# Patient Record
Sex: FEMALE | Race: WHITE | Employment: FULL TIME | URBAN - METROPOLITAN AREA
[De-identification: names, ages, dates, MRNs, and addresses within clinical notes are randomized per-mention and may not be internally consistent; named-entity substitution may affect disease eponyms.]

---

## 2023-10-02 PROBLEM — Z36.89 NST (NON-STRESS TEST) REACTIVE (HHS-HCC): Status: ACTIVE | Noted: 2023-10-02

## 2023-10-02 PROBLEM — N83.299 OTHER OVARIAN CYST, UNSPECIFIED SIDE: Status: ACTIVE | Noted: 2023-10-02

## 2023-10-02 PROBLEM — E66.01 OBESITY, CLASS III, BMI 40-49.9 (MORBID OBESITY) (MULTI): Status: ACTIVE | Noted: 2023-10-02

## 2023-10-02 PROBLEM — R73.09 ABNORMAL GLUCOSE TOLERANCE TEST (GTT): Status: ACTIVE | Noted: 2023-10-02

## 2023-10-02 PROBLEM — O09.90 HIGH RISK PREGNANCY, ANTEPARTUM (HHS-HCC): Status: ACTIVE | Noted: 2023-10-02

## 2023-10-02 PROBLEM — I10 CHRONIC HYPERTENSION: Status: ACTIVE | Noted: 2023-10-02

## 2023-10-02 PROBLEM — O10.919 CHRONIC HYPERTENSION IN PREGNANCY (HHS-HCC): Status: ACTIVE | Noted: 2023-10-02

## 2023-10-02 PROBLEM — O14.90 PRE-ECLAMPSIA AFFECTING PREGNANCY, ANTEPARTUM (HHS-HCC): Status: ACTIVE | Noted: 2023-10-02

## 2023-10-02 PROBLEM — Z22.330 GBS CARRIER: Status: ACTIVE | Noted: 2023-10-02

## 2023-10-02 RX ORDER — NIFEDIPINE 60 MG/1
1 TABLET, FILM COATED, EXTENDED RELEASE ORAL DAILY
COMMUNITY
Start: 2022-01-22 | End: 2023-10-04 | Stop reason: ALTCHOICE

## 2023-10-02 RX ORDER — NAPROXEN SODIUM 220 MG/1
81 TABLET, FILM COATED ORAL DAILY
COMMUNITY
Start: 2021-07-26 | End: 2024-04-27 | Stop reason: HOSPADM

## 2023-10-02 RX ORDER — IBUPROFEN 600 MG/1
600 TABLET ORAL EVERY 6 HOURS
COMMUNITY
Start: 2022-02-14 | End: 2023-10-04 | Stop reason: ALTCHOICE

## 2023-10-02 RX ORDER — FLUOXETINE HYDROCHLORIDE 40 MG/1
1.5 CAPSULE ORAL DAILY
COMMUNITY
End: 2023-10-04 | Stop reason: ALTCHOICE

## 2023-10-02 RX ORDER — DOCUSATE SODIUM 100 MG/1
100 CAPSULE, LIQUID FILLED ORAL 2 TIMES DAILY PRN
COMMUNITY
Start: 2022-02-14 | End: 2023-10-04 | Stop reason: ALTCHOICE

## 2023-10-02 RX ORDER — ACETAMINOPHEN 500 MG
1000 TABLET ORAL EVERY 6 HOURS PRN
COMMUNITY
Start: 2022-02-14

## 2023-10-02 RX ORDER — BREAST PUMP
EACH MISCELLANEOUS
COMMUNITY
Start: 2022-01-27 | End: 2023-10-04 | Stop reason: ALTCHOICE

## 2023-10-02 RX ORDER — CITALOPRAM 40 MG/1
1 TABLET, FILM COATED ORAL DAILY
COMMUNITY
End: 2023-10-04 | Stop reason: ALTCHOICE

## 2023-10-04 ENCOUNTER — INITIAL PRENATAL (OUTPATIENT)
Dept: OBSTETRICS AND GYNECOLOGY | Facility: CLINIC | Age: 33
End: 2023-10-04
Payer: COMMERCIAL

## 2023-10-04 VITALS — DIASTOLIC BLOOD PRESSURE: 80 MMHG | BODY MASS INDEX: 45.6 KG/M2 | WEIGHT: 274 LBS | SYSTOLIC BLOOD PRESSURE: 162 MMHG

## 2023-10-04 DIAGNOSIS — Z34.91 PRENATAL CARE IN FIRST TRIMESTER (HHS-HCC): ICD-10-CM

## 2023-10-04 DIAGNOSIS — I10 CHRONIC HYPERTENSION: ICD-10-CM

## 2023-10-04 DIAGNOSIS — Z3A.08 8 WEEKS GESTATION OF PREGNANCY (HHS-HCC): Primary | ICD-10-CM

## 2023-10-04 DIAGNOSIS — O09.91 HIGH-RISK PREGNANCY IN FIRST TRIMESTER (HHS-HCC): ICD-10-CM

## 2023-10-04 DIAGNOSIS — O10.919 CHRONIC HYPERTENSION AFFECTING PREGNANCY (HHS-HCC): ICD-10-CM

## 2023-10-04 PROBLEM — R73.09 ABNORMAL GLUCOSE TOLERANCE TEST (GTT): Status: RESOLVED | Noted: 2023-10-02 | Resolved: 2023-10-04

## 2023-10-04 PROBLEM — Z22.330 GBS CARRIER: Status: RESOLVED | Noted: 2023-10-02 | Resolved: 2023-10-04

## 2023-10-04 PROBLEM — O14.90 PRE-ECLAMPSIA AFFECTING PREGNANCY, ANTEPARTUM (HHS-HCC): Status: RESOLVED | Noted: 2023-10-02 | Resolved: 2023-10-04

## 2023-10-04 PROCEDURE — 84156 ASSAY OF PROTEIN URINE: CPT

## 2023-10-04 PROCEDURE — 87086 URINE CULTURE/COLONY COUNT: CPT

## 2023-10-04 PROCEDURE — 82570 ASSAY OF URINE CREATININE: CPT

## 2023-10-04 PROCEDURE — 87800 DETECT AGNT MULT DNA DIREC: CPT

## 2023-10-04 PROCEDURE — 0500F INITIAL PRENATAL CARE VISIT: CPT | Performed by: ADVANCED PRACTICE MIDWIFE

## 2023-10-04 RX ORDER — NIFEDIPINE 30 MG/1
30 TABLET, FILM COATED, EXTENDED RELEASE ORAL
Qty: 30 TABLET | Refills: 3 | Status: SHIPPED | OUTPATIENT
Start: 2023-10-04 | End: 2023-10-27

## 2023-10-04 RX ORDER — SERTRALINE HYDROCHLORIDE 50 MG/1
50 TABLET, FILM COATED ORAL DAILY
COMMUNITY
Start: 2023-09-08 | End: 2023-12-12 | Stop reason: ALTCHOICE

## 2023-10-04 ASSESSMENT — EDINBURGH POSTNATAL DEPRESSION SCALE (EPDS)
I HAVE LOOKED FORWARD WITH ENJOYMENT TO THINGS: AS MUCH AS I EVER DID
I HAVE BLAMED MYSELF UNNECESSARILY WHEN THINGS WENT WRONG: NOT VERY OFTEN
THE THOUGHT OF HARMING MYSELF HAS OCCURRED TO ME: NEVER
I HAVE BEEN ABLE TO LAUGH AND SEE THE FUNNY SIDE OF THINGS: AS MUCH AS I ALWAYS COULD
I HAVE FELT SCARED OR PANICKY FOR NO GOOD REASON: NO, NOT MUCH
THINGS HAVE BEEN GETTING ON TOP OF ME: YES, SOMETIMES I HAVEN'T BEEN COPING AS WELL AS USUAL
I HAVE BEEN SO UNHAPPY THAT I HAVE BEEN CRYING: NO, NEVER
I HAVE BEEN SO UNHAPPY THAT I HAVE HAD DIFFICULTY SLEEPING: NOT AT ALL
TOTAL SCORE: 6
I HAVE FELT SAD OR MISERABLE: NOT VERY OFTEN
I HAVE BEEN ANXIOUS OR WORRIED FOR NO GOOD REASON: HARDLY EVER

## 2023-10-04 NOTE — PROGRESS NOTES
Assessment   Problem List Items Addressed This Visit             ICD-10-CM    Chronic hypertension I10    Relevant Medications    NIFEdipine ER (NIFEdipine XL) 30 mg 24 hr tablet    Chronic hypertension affecting pregnancy O10.919    Relevant Medications    NIFEdipine ER (NIFEdipine XL) 30 mg 24 hr tablet     Other Visit Diagnoses         Codes    8 weeks gestation of pregnancy    -  Primary Z3A.08    Relevant Orders    US MAC OB imaging order    Lactate Dehydrogenase    Protein, Urine Random (Completed)    Uric Acid    Comprehensive Metabolic Panel    CBC    Referral to Maternal Fetal Medicine    Hemoglobin A1c    BMI 45.0-49.9, adult (CMS/[facility])     Z68.42    Relevant Orders    Hemoglobin A1c    Prenatal care in first trimester     Z34.91    Relevant Orders    ABO/Rh    CBC Anemia Panel With Reflex,Pregnancy    HEMOGLOBIN IDENTIFICATION WITH PATH REVIEW    Hepatitis B surface antigen    Hepatitis C antibody    Rubella Antibody, IgG    Syphilis Screen with Reflex    HIV 1/2 Antigen/Antibody Screen with Reflex to Confirmation    C. trachomatis + N. gonorrhoeae, Amplified    Urine culture    High-risk pregnancy in first trimester     O09.91            Plan   NOB plan: New OB resources provided and reviewed with particular attention to dietary, travel, and medication restrictions  Oriented to practice, CNM vs. MD care  Reviewed bleeding precautions, warning signs, when to call provider; phone number provided  Discussed bASA for PEC prophylaxis  Routine NOB labs ordered  MFM consult placed for CHTN, started on meds   Return in 4 weeks for routine prenatal care w/ MD only   Req for ultrasound given   Consult w/ DR. Garcia today re: pt. BP. Plan to start Nifedipine 30mg daily. Discussed importance of home BP monitoring by patient and to call if not in goal range.     Saige Crawford, CA-TREYM    planned  Subjective   Cheyenne Wolf is a 33 y.o.  at 8w3d with a working estimated date of delivery of  2024, by Last Menstrual Period who presents for an initial prenatal visit. This pregnancy is planned.    Patient currently experiencing:  none, anxiety re: increased BP     Bleeding or cramping since LMP: no  Taking prenatal vitamin: Yes  Ultrasound completed this pregnancy: No    OB History    Para Term  AB Living   2 1 0 1 0 1   SAB IAB Ectopic Multiple Live Births   0 0 0 0 1      # Outcome Date GA Lbr Kyree/2nd Weight Sex Delivery Anes PTL Lv   2 Current            1  22 36w5d  2580 g M Vag-Spont  Y MORENA      Complications: Preeclampsia        Prior pregnancy complications: pre-eclampsia    History of hypertension:  Yes, chronic hypertension    Past Medical History:   Diagnosis Date    Contusion of right lesser toe(s) without damage to nail, initial encounter 2019    Contusion of toe, right    Depression     H/O pre-eclampsia in prior pregnancy, currently pregnant, third trimester     Pain in unspecified toe(s) 2019    Toe pain      History reviewed. No pertinent surgical history.     Social History     Tobacco Use    Smoking status: Never    Smokeless tobacco: Never   Vaping Use    Vaping Use: Never used   Substance Use Topics    Alcohol use: Not Currently    Drug use: Never        Objective   Physical Exam  Weight: 124 kg (274 lb)  Expected Total Weight Gain: Could not be calculated   Pregravid BMI: Could not be calculated  BP: 162/80    OBGyn Exam    Postpartum Depression: Not on file        Pregnancy Problems (from 10/04/23 to present)       No problems associated with this episode.

## 2023-10-05 LAB
BACTERIA UR CULT: NORMAL
C TRACH RRNA SPEC QL NAA+PROBE: NEGATIVE
CREAT UR-MCNC: 52.6 MG/DL (ref 20–320)
N GONORRHOEA DNA SPEC QL PROBE+SIG AMP: NEGATIVE
PROT UR-ACNC: 5 MG/DL (ref 5–24)
PROT/CREAT UR: 0.1 MG/MG CREAT (ref 0–0.17)

## 2023-10-12 ENCOUNTER — TELEPHONE (OUTPATIENT)
Dept: OBSTETRICS AND GYNECOLOGY | Facility: CLINIC | Age: 33
End: 2023-10-12
Payer: COMMERCIAL

## 2023-10-12 DIAGNOSIS — F32.A DEPRESSION AFFECTING PREGNANCY (HHS-HCC): Primary | ICD-10-CM

## 2023-10-12 DIAGNOSIS — O99.340 DEPRESSION AFFECTING PREGNANCY (HHS-HCC): Primary | ICD-10-CM

## 2023-10-12 NOTE — TELEPHONE ENCOUNTER
Pt is 9.4 wks and saw Saige for a NOB appt 10/4/23. She has an upcoming appt with Dr. Garcia. Pt takes 50mg zoloft daily and is requesting a higher dose due to increasing anxiety. Pt has been referred to the  o/p team but has been playing phone tag for an appt. Pt is asking if Dr. Garcia or Saige can increase her dose until she can be seen. I have sent an email to the perinatalOP team to call pt for an appt. She is aware office will call her back once a provider advises on Rx.

## 2023-10-13 NOTE — TELEPHONE ENCOUNTER
Component      Latest Ref Rng & Units 7/5/2021 9/9/2022   Fasting Status      Hours 12    CHOLESTEROL      <=199 mg/dL 182 143   TRIGLYCERIDE      <=149 mg/dL 112 108   HDL      >=40 mg/dL 51 49   CALCULATED LDL      <=129 mg/dL 109 72   CALCULATED NON HDL      mg/dL 131 94   CHOL/HDL      <=4.4 3.6 2.9      Pt is aware that zoloft will be increased to 75mg daily and that Saige will send Rx to her pharm. Pt also states that she was able to get an appt with Dr. Lopez 10/27/23.

## 2023-10-15 RX ORDER — SERTRALINE HYDROCHLORIDE 25 MG/1
25 TABLET, FILM COATED ORAL DAILY
Qty: 30 TABLET | Refills: 0 | Status: SHIPPED | OUTPATIENT
Start: 2023-10-15 | End: 2023-11-10

## 2023-10-18 ENCOUNTER — APPOINTMENT (OUTPATIENT)
Dept: BEHAVIORAL HEALTH | Facility: CLINIC | Age: 33
End: 2023-10-18
Payer: COMMERCIAL

## 2023-10-23 ENCOUNTER — LAB (OUTPATIENT)
Dept: LAB | Facility: LAB | Age: 33
End: 2023-10-23
Payer: COMMERCIAL

## 2023-10-23 DIAGNOSIS — Z3A.08 8 WEEKS GESTATION OF PREGNANCY (HHS-HCC): ICD-10-CM

## 2023-10-23 DIAGNOSIS — Z34.91 PRENATAL CARE IN FIRST TRIMESTER (HHS-HCC): ICD-10-CM

## 2023-10-23 LAB
ABO GROUP (TYPE) IN BLOOD: NORMAL
ALBUMIN SERPL BCP-MCNC: 4.1 G/DL (ref 3.4–5)
ALP SERPL-CCNC: 53 U/L (ref 33–110)
ALT SERPL W P-5'-P-CCNC: 15 U/L (ref 7–45)
ANION GAP SERPL CALC-SCNC: 12 MMOL/L (ref 10–20)
AST SERPL W P-5'-P-CCNC: 13 U/L (ref 9–39)
BILIRUB SERPL-MCNC: 0.3 MG/DL (ref 0–1.2)
BUN SERPL-MCNC: 7 MG/DL (ref 6–23)
CALCIUM SERPL-MCNC: 9.2 MG/DL (ref 8.6–10.3)
CHLORIDE SERPL-SCNC: 102 MMOL/L (ref 98–107)
CO2 SERPL-SCNC: 25 MMOL/L (ref 21–32)
CREAT SERPL-MCNC: 0.61 MG/DL (ref 0.5–1.05)
ERYTHROCYTE [DISTWIDTH] IN BLOOD BY AUTOMATED COUNT: 12.6 % (ref 11.5–14.5)
GFR SERPL CREATININE-BSD FRML MDRD: >90 ML/MIN/1.73M*2
GLUCOSE SERPL-MCNC: 105 MG/DL (ref 74–99)
HCT VFR BLD AUTO: 40.1 % (ref 36–46)
HGB BLD-MCNC: 13.4 G/DL (ref 12–16)
LDH SERPL L TO P-CCNC: 151 U/L (ref 84–246)
MCH RBC QN AUTO: 30.9 PG (ref 26–34)
MCHC RBC AUTO-ENTMCNC: 33.4 G/DL (ref 32–36)
MCV RBC AUTO: 92 FL (ref 80–100)
NRBC BLD-RTO: 0 /100 WBCS (ref 0–0)
PLATELET # BLD AUTO: 318 X10*3/UL (ref 150–450)
PMV BLD AUTO: 9.1 FL (ref 7.5–11.5)
POTASSIUM SERPL-SCNC: 3.6 MMOL/L (ref 3.5–5.3)
PROT SERPL-MCNC: 7.3 G/DL (ref 6.4–8.2)
RBC # BLD AUTO: 4.34 X10*6/UL (ref 4–5.2)
RH FACTOR (ANTIGEN D): NORMAL
SODIUM SERPL-SCNC: 135 MMOL/L (ref 136–145)
URATE SERPL-MCNC: 4.4 MG/DL (ref 2.3–6.7)
WBC # BLD AUTO: 9.2 X10*3/UL (ref 4.4–11.3)

## 2023-10-23 PROCEDURE — 83615 LACTATE (LD) (LDH) ENZYME: CPT

## 2023-10-23 PROCEDURE — 84550 ASSAY OF BLOOD/URIC ACID: CPT

## 2023-10-23 PROCEDURE — 86900 BLOOD TYPING SEROLOGIC ABO: CPT

## 2023-10-23 PROCEDURE — 86317 IMMUNOASSAY INFECTIOUS AGENT: CPT

## 2023-10-23 PROCEDURE — 85027 COMPLETE CBC AUTOMATED: CPT

## 2023-10-23 PROCEDURE — 87389 HIV-1 AG W/HIV-1&-2 AB AG IA: CPT

## 2023-10-23 PROCEDURE — 83036 HEMOGLOBIN GLYCOSYLATED A1C: CPT

## 2023-10-23 PROCEDURE — 80053 COMPREHEN METABOLIC PANEL: CPT

## 2023-10-23 PROCEDURE — 87340 HEPATITIS B SURFACE AG IA: CPT

## 2023-10-23 PROCEDURE — 36415 COLL VENOUS BLD VENIPUNCTURE: CPT

## 2023-10-23 PROCEDURE — 83021 HEMOGLOBIN CHROMOTOGRAPHY: CPT

## 2023-10-23 PROCEDURE — 86901 BLOOD TYPING SEROLOGIC RH(D): CPT

## 2023-10-23 PROCEDURE — 86780 TREPONEMA PALLIDUM: CPT

## 2023-10-23 PROCEDURE — 83020 HEMOGLOBIN ELECTROPHORESIS: CPT

## 2023-10-23 PROCEDURE — 86803 HEPATITIS C AB TEST: CPT

## 2023-10-23 ASSESSMENT — ENCOUNTER SYMPTOMS
NECK PAIN: 0
PALPITATIONS: 0
SWEATS: 0
BLURRED VISION: 0
SHORTNESS OF BREATH: 0
HYPERTENSION: 1
PND: 0
ORTHOPNEA: 0
HEADACHES: 0

## 2023-10-24 LAB
EST. AVERAGE GLUCOSE BLD GHB EST-MCNC: 105 MG/DL
HBA1C MFR BLD: 5.3 %
HBV SURFACE AG SERPL QL IA: NONREACTIVE
HCV AB SER QL: NONREACTIVE
HIV 1+2 AB+HIV1 P24 AG SERPL QL IA: NONREACTIVE
REFLEX ADDED, ANEMIA PANEL: NORMAL
RUBV IGG SERPL IA-ACNC: 1.9 IA
RUBV IGG SERPL QL IA: POSITIVE
T PALLIDUM AB SER QL: NONREACTIVE

## 2023-10-25 ENCOUNTER — INITIAL PRENATAL (OUTPATIENT)
Dept: MATERNAL FETAL MEDICINE | Facility: CLINIC | Age: 33
End: 2023-10-25
Payer: COMMERCIAL

## 2023-10-25 ENCOUNTER — TELEPHONE (OUTPATIENT)
Dept: OBSTETRICS AND GYNECOLOGY | Facility: CLINIC | Age: 33
End: 2023-10-25

## 2023-10-25 ENCOUNTER — ANCILLARY PROCEDURE (OUTPATIENT)
Dept: RADIOLOGY | Facility: CLINIC | Age: 33
End: 2023-10-25
Payer: COMMERCIAL

## 2023-10-25 ENCOUNTER — SOCIAL WORK (OUTPATIENT)
Dept: BEHAVIORAL HEALTH | Facility: CLINIC | Age: 33
End: 2023-10-25
Payer: COMMERCIAL

## 2023-10-25 VITALS — WEIGHT: 274 LBS | BODY MASS INDEX: 45.6 KG/M2 | SYSTOLIC BLOOD PRESSURE: 158 MMHG | DIASTOLIC BLOOD PRESSURE: 104 MMHG

## 2023-10-25 DIAGNOSIS — Z3A.08 8 WEEKS GESTATION OF PREGNANCY (HHS-HCC): ICD-10-CM

## 2023-10-25 DIAGNOSIS — F43.10 PTSD (POST-TRAUMATIC STRESS DISORDER): ICD-10-CM

## 2023-10-25 DIAGNOSIS — O10.919 CHRONIC HYPERTENSION AFFECTING PREGNANCY (HHS-HCC): Primary | ICD-10-CM

## 2023-10-25 DIAGNOSIS — F41.1 GAD (GENERALIZED ANXIETY DISORDER): ICD-10-CM

## 2023-10-25 DIAGNOSIS — F41.9 ANXIETY: ICD-10-CM

## 2023-10-25 PROCEDURE — 76801 OB US < 14 WKS SINGLE FETUS: CPT | Performed by: OBSTETRICS & GYNECOLOGY

## 2023-10-25 PROCEDURE — 99213 OFFICE O/P EST LOW 20 MIN: CPT | Performed by: OBSTETRICS & GYNECOLOGY

## 2023-10-25 PROCEDURE — 76801 OB US < 14 WKS SINGLE FETUS: CPT

## 2023-10-25 PROCEDURE — 90686 IIV4 VACC NO PRSV 0.5 ML IM: CPT | Performed by: OBSTETRICS & GYNECOLOGY

## 2023-10-25 PROCEDURE — 90791 PSYCH DIAGNOSTIC EVALUATION: CPT | Mod: AJ,95 | Performed by: SOCIAL WORKER

## 2023-10-25 PROCEDURE — 99213 OFFICE O/P EST LOW 20 MIN: CPT | Mod: 25 | Performed by: OBSTETRICS & GYNECOLOGY

## 2023-10-25 RX ORDER — ASPIRIN 81 MG/1
162 TABLET ORAL ONCE
Status: DISCONTINUED | OUTPATIENT
Start: 2023-10-25 | End: 2024-04-27 | Stop reason: HOSPADM

## 2023-10-25 NOTE — TELEPHONE ENCOUNTER
Attempted to call pt back to further discuss her message.   Got her voice mail.     Message left to call office.

## 2023-10-25 NOTE — PROGRESS NOTES
[facility]  Department of Obstetrics & Gynecology  Division of Maternal Fetal Medicine    Patient Name: Cheyenne Wolf  Patient YOB: 1990  Patient MRN: [id number]  Estimated Date of Delivery: 24    Cheyenne Wolf is a  at 11w3d who presents for [facility] consultation for chronic hypertension. Pregnancy is also complicated by anxiety/depression, history of superimposed preeclampsia with severe features, class III obesity.    Chronic Hypertension, with history of superimposed preeclampsia with severe features  After discussion with the patient and review of home BP (mostly 125/80s, max one time was 140/90), it is evident the patient has concurrent significant white coat hypertension. We discussed that she should take her BP at home prior to appointments to help confirm this.  Currently stable on nifedipine ER 30mg daily  Pre-pregnancy meds: none  Start aspirin 162mg daily  Baseline PreE labs WNL, uPCR 0.1  Blood pressure goals in pregnancy are 130-140s/80-90s. Encouraged home monitoring of BP. BP log given. If >50% of home BP are >140/90s, I recommend laboratory and symptom assessment of preeclampsia, and if that diagnosis has been excluded, increasing the dose of her antihypertensive medication.  Hypertension, with or without antihypertensive drug treatment is associated with adverse pregnancy outcome including fetal growth restriction, stillbirth,  delivery, and placental abruption.  Maternal risks are also increased including PE, stroke, SiPreE (especially given her prior history of superimposed preeclampsia), compared with normotensive pregnancies.    With TN, the patient will be monitored more closely with growth ultrasounds (every 4 weeks beginning at 24w) and BPPs (every week beginning at 32w) if on medications.    In the absence of other maternal or fetal indications for delivery, plan for delivery around 37-38w (pending BP control)    Anxiety and depression  Current  medications: zoloft 75mcg. Was previously on prozac but changed to zoloft in August for pregnancy.  She can go back on this if she needs to, prozac is safe in pregnancy.  SSRI/SNRIs increase risk of  Poor Adjustment Syndrome: Irritability, feeding difficulties, increased respiratory rate. Severe symptoms are rare and occur in 3%. Symptoms peak in 24-48h and resolve in a week. There is no evidence that discontinuing or tapering these medications in the third trimester reduces this risk, but it does increase the risk of maternal relapse.   When weighing the risks and benefits, in regards to continued treatment, it is important to note that having untreated psychiatric disorders is related to pregnancy complications (LBW, PTD).  Each patient has to make an individualized decision based upon their level of comfort with either continuation or discontinuation of medication.    She has follow-up scheduled with Dr. Lopez's group on 10/27    Class III obesity  With increasing BMI there is known to be associated with several increased risks including increased risk ongenital malformations, pre-term delivery, gestational diabetes, pre-eclampsia,  delivery and VTE.   Once pregnant, there is a recommended weight gain that is lower than if the patient has a normal BMI prior to pregnancy.  These goals should be discussed at the patient's first prenatal visit.  We recommend Ms. Wolf gain no more than 20lb this pregnancy.   surveillance as above  Healthy diet and exercise encouraged    Routine prenatal care  Prenatal labs WNL, Rh positive  Desires cfDNA with primary OB-GYN  NT US in 2 weeks  Flu vaccine administered today  COVID-19 - booster encouraged, especially with two upcoming trips ([place],  and [facility]) in the next month    Thank you for this consult. Please reach out for any questions or concerns.    Sandra Orozco MD  Maternal Fetal Medicine    HPI: Overall doing ok. Very anxious about  "being in a physicians office. She reports significant birth trauma regarding last pregnancy (superimposed preeclampsia with severe features,  delivery) and is tearful today. [place] management last pregnancy was very \"reactive\" and would like to change that somehow. No nausea or vomiting, but reports significant fatigue. Denies vaginal bleeding or cramping    Obstetrical History  :  at 36w, IOL superimposed preeclampsia with severe features (BP); VMI [id number]  : current    Gynecological History  Last pap smear 2023: cytology negative, HPV negative  Remote history of abnormal pap smear  Denies history of sexually transmitted infections    Medical History  CHTN  Class III obesity    Surgical History  Denies    Medications  Nifedipine XL 30mg daily  Prenatal vitamin  Zoloft 75mg  *To start aspirin 162mg today    Allergies  No Known Allergies    Family History  Denies family history of birth defects  Maternal grand mother with Non-Hodgkins lymphoma    Social History  Nonsmoker  Denies illicit drug use    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

## 2023-10-26 LAB
HEMOGLOBIN A2: 2.7 % (ref 2–3.5)
HEMOGLOBIN A: 97 % (ref 95.8–98)
HEMOGLOBIN F: 0.3 % (ref 0–2)
HEMOGLOBIN IDENTIFICATION INTERPRETATION: NORMAL
PATH REVIEW-HGB IDENTIFICATION: NORMAL

## 2023-10-26 NOTE — TELEPHONE ENCOUNTER
Pt returned call to office.       Pt desires to have cfDNA test.    Pt will plan to pickvup box kit tomorrow.

## 2023-10-27 ENCOUNTER — TELEMEDICINE (OUTPATIENT)
Dept: BEHAVIORAL HEALTH | Facility: CLINIC | Age: 33
End: 2023-10-27
Payer: COMMERCIAL

## 2023-10-27 ENCOUNTER — LAB (OUTPATIENT)
Dept: LAB | Facility: LAB | Age: 33
End: 2023-10-27
Payer: COMMERCIAL

## 2023-10-27 ENCOUNTER — APPOINTMENT (OUTPATIENT)
Dept: BEHAVIORAL HEALTH | Facility: CLINIC | Age: 33
End: 2023-10-27
Payer: COMMERCIAL

## 2023-10-27 DIAGNOSIS — O09.90 HIGH RISK PREGNANCY, ANTEPARTUM (HHS-HCC): ICD-10-CM

## 2023-10-27 DIAGNOSIS — F43.10 PTSD (POST-TRAUMATIC STRESS DISORDER): ICD-10-CM

## 2023-10-27 DIAGNOSIS — F41.1 GAD (GENERALIZED ANXIETY DISORDER): ICD-10-CM

## 2023-10-27 DIAGNOSIS — I10 CHRONIC HYPERTENSION: ICD-10-CM

## 2023-10-27 DIAGNOSIS — O10.919 CHRONIC HYPERTENSION AFFECTING PREGNANCY (HHS-HCC): ICD-10-CM

## 2023-10-27 PROCEDURE — 36415 COLL VENOUS BLD VENIPUNCTURE: CPT

## 2023-10-27 PROCEDURE — 99214 OFFICE O/P EST MOD 30 MIN: CPT | Performed by: STUDENT IN AN ORGANIZED HEALTH CARE EDUCATION/TRAINING PROGRAM

## 2023-10-27 RX ORDER — FLUOXETINE HYDROCHLORIDE 40 MG/1
40 CAPSULE ORAL DAILY
Qty: 30 CAPSULE | Refills: 11 | Status: SHIPPED | OUTPATIENT
Start: 2023-10-27 | End: 2023-11-27 | Stop reason: SDUPTHER

## 2023-10-27 RX ORDER — NIFEDIPINE 30 MG/1
30 TABLET, FILM COATED, EXTENDED RELEASE ORAL
Qty: 90 TABLET | Refills: 2 | Status: SHIPPED | OUTPATIENT
Start: 2023-10-27 | End: 2024-04-08 | Stop reason: WASHOUT

## 2023-10-27 RX ORDER — LORAZEPAM 0.5 MG/1
0.5 TABLET ORAL DAILY PRN
Qty: 14 TABLET | Refills: 1 | Status: SHIPPED | OUTPATIENT
Start: 2023-10-27 | End: 2023-12-11 | Stop reason: SDUPTHER

## 2023-10-31 ENCOUNTER — APPOINTMENT (OUTPATIENT)
Dept: OBSTETRICS AND GYNECOLOGY | Facility: CLINIC | Age: 33
End: 2023-10-31
Payer: COMMERCIAL
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
## 2023-11-07 ENCOUNTER — TELEPHONE (OUTPATIENT)
Dept: OBSTETRICS AND GYNECOLOGY | Facility: CLINIC | Age: 33
End: 2023-11-07
Payer: COMMERCIAL

## 2023-11-07 NOTE — TELEPHONE ENCOUNTER
----- Message from CA Enriquez-TREYM sent at 11/6/2023  7:35 PM EST -----  Risk reducing cfDNA  Male

## 2023-11-07 NOTE — TELEPHONE ENCOUNTER
Pt is aware of negative Myriad results and that it states Male. She states that she saw results on line yesterday!

## 2023-11-08 ENCOUNTER — APPOINTMENT (OUTPATIENT)
Dept: RADIOLOGY | Facility: CLINIC | Age: 33
End: 2023-11-08
Payer: COMMERCIAL

## 2023-11-09 ENCOUNTER — ANCILLARY PROCEDURE (OUTPATIENT)
Dept: RADIOLOGY | Facility: CLINIC | Age: 33
End: 2023-11-09
Payer: COMMERCIAL

## 2023-11-09 DIAGNOSIS — Z34.90 ENCOUNTER FOR SUPERVISION OF NORMAL PREGNANCY, UNSPECIFIED, UNSPECIFIED TRIMESTER (HHS-HCC): ICD-10-CM

## 2023-11-09 PROCEDURE — 76815 OB US LIMITED FETUS(S): CPT | Performed by: STUDENT IN AN ORGANIZED HEALTH CARE EDUCATION/TRAINING PROGRAM

## 2023-11-09 PROCEDURE — 76801 OB US < 14 WKS SINGLE FETUS: CPT

## 2023-11-10 DIAGNOSIS — F32.A DEPRESSION AFFECTING PREGNANCY (HHS-HCC): ICD-10-CM

## 2023-11-10 DIAGNOSIS — O99.340 DEPRESSION AFFECTING PREGNANCY (HHS-HCC): ICD-10-CM

## 2023-11-10 RX ORDER — SERTRALINE HYDROCHLORIDE 25 MG/1
TABLET, FILM COATED ORAL
Qty: 90 TABLET | Refills: 1 | Status: SHIPPED | OUTPATIENT
Start: 2023-11-10 | End: 2023-12-12 | Stop reason: ALTCHOICE

## 2023-11-20 ENCOUNTER — TELEMEDICINE (OUTPATIENT)
Dept: BEHAVIORAL HEALTH | Facility: CLINIC | Age: 33
End: 2023-11-20
Payer: COMMERCIAL

## 2023-11-20 ENCOUNTER — CLINICAL SUPPORT (OUTPATIENT)
Dept: BEHAVIORAL HEALTH | Facility: CLINIC | Age: 33
End: 2023-11-20
Payer: COMMERCIAL

## 2023-11-20 DIAGNOSIS — F41.1 GENERALIZED ANXIETY DISORDER: ICD-10-CM

## 2023-11-20 DIAGNOSIS — F43.10 POSTTRAUMATIC STRESS DISORDER: ICD-10-CM

## 2023-11-20 DIAGNOSIS — F41.1 GAD (GENERALIZED ANXIETY DISORDER): ICD-10-CM

## 2023-11-20 DIAGNOSIS — F43.10 PTSD (POST-TRAUMATIC STRESS DISORDER): ICD-10-CM

## 2023-11-20 PROCEDURE — 99214 OFFICE O/P EST MOD 30 MIN: CPT | Performed by: STUDENT IN AN ORGANIZED HEALTH CARE EDUCATION/TRAINING PROGRAM

## 2023-11-20 PROCEDURE — 90853 GROUP PSYCHOTHERAPY: CPT | Mod: AJ,95 | Performed by: SOCIAL WORKER

## 2023-11-20 NOTE — GROUP NOTE
Group Topic: Coping Skills   Group Date: 11/20/2023  Start Time: 10:00 AM  End Time: 11:00 AM  Facilitators: MANAN Shah   Department: [facility]        Name: Cheyenne Wolf YOB: 1990   MR: [id number]      This was a video IOP group on a HIPAA compliant platform. All patients were provided with informed consent.  Date: 11/20/23, Time: 10am-11pm group, Number of Patients:3, User Name: MANAN jamison  Number of Staff: 1    Group topic(s): Codependent relationships   Provide psychoeducation on codependent relationships. Discussed signs of codependency and ways to prevent/stop behaviors. Played video for group on codependent relationships. Gave group members the opportunity to share what's important to them in relationships. Reviewed healthy vs unhealthy characteristics in relationships. Cheyenne was present, attentive.   Facilitator: MANAN Babb

## 2023-11-20 NOTE — PROGRESS NOTES
Subjective    All Individuals Present: Patient and Provider (Encounter Provider)     ID: Cheyenne Wolf is a 33 y.o. female with history of KENJI and PTSD presenting to  IOP.     Interval History/HPI/PFSH:  @15wga  Was anxious about starting group today but feeling more reassured.    Was able to take older son to [place] and felt better, did not expect to be less anxious taking son on her own. Has only taken lorazepam 3 times in past month.    Feeling more confident and assured about communicating with providers.    Bps have been stable, normotensive.    Will be hosting Thanksgiving this week.    Denies SI, HI, AVH.     Medication side effects: None Reported     Review of Systems  Constitutional: Negative  Psychiatric: Positive for anxiety, fatigue, and phobia , Negative for depression, irritability, mood swings, and sleep disturbance  Neurological: Negative   Other: pregnant @15wga    Objective   LMP 2023 (Exact Date)   Wt Readings from Last 4 Encounters:   24 123 kg (271 lb)   23 122 kg (270 lb)   10/25/23 124 kg (274 lb)   10/04/23 124 kg (274 lb)       Mental Status Exam  General Appearance: Well groomed, appropriate eye contact.  Attitude/Behavior: Cooperative, conversant, engaged, and with good eye contact.  Motor: No psychomotor agitation or retardation, no tremor or other abnormal movements.  Speech: Normal rate, volume, prosody  Gait/Station: Within normal limits  Mood: anxious.  Affect: Constricted, Anxious, and Congruent with mood and topic of conversation  Thought Process: Linear, goal directed  Thought Associations: No loosening of associations  Thought Content: normal  Sensorium: Alert and oriented to person, place, time and situation  Insight: Intact, as evidenced by awareness of symptom patterns, introspection  Judgment: Intact  Cognition: Cognitively intact to conversational testing with respect to attention, orientation, fund of knowledge, recent and remote memory, and  language.  Testing: N/A.    Laboratory/Imaging/Diagnostic Tests       Assessment/Plan   Overall Formulation and Differential Diagnosis:  Cheyenne Wolf is a 33 y.o. female who meets criteria for KENJI and PTSD.  Interval Assessment:   @12wga presenting to [facility] d/t worsening anxiety and hypervigilance in pregnancy. Had previously been on fluoxetine to fair effect after traumatic birth experience with first child. Was switched to sertraline with current pregnancy by outside provider, has noted worsening anxiety and no purported benefit from such. Will try transitioning back to fluoxetine given prior efficacy. Discussed limited data regarding fluoxetine, and discussed risks, benefits, and alternatives in context of pregnancy and postpartum, and she was agreeable.    Plan:  -discontinue sertraline  -increase fluoxetine to 40 mg PO daily, plan to taper to effect  -Reviewed treatment goals listed in treatment plan and pt progressing towards these goals as seen by engagement in groups.  -Continue Intensive Outpatient Program.     Risk Assessment:  Imminent Risk of Suicide or Serious Self-Injury: Low Risk -- Risk factors include: History of trauma or abuse  Protective factors include:Denies current suicidal ideation, Denies history of suicide attempts , Future-oriented talk , Willingness to seek help and support , Skills in problem solving, conflict resolution, and nonviolent handling of disputes, Cultural and Pentecostal beliefs that discourage suicide and support self-preservation , Access to a variety of clinical interventions , Receiving and engaged in care for mental, physical, and substance use disorders , History of adhering to treatment recommendations and/or prescribed medication regimen , Support through ongoing medical and mental healthcare relationships , Current/history of good response to treatment/meds , Interpersonal relationships and supports, e.g., family, friends, peers, community , and Restricted access  to firearms or other lethal means of suicide   Imminent Risk of Violence or Homicide: Low Risk - Risk factors include: No significant risk factors identified on screening. Protective factors include: Lack of known history of harm to others , Lack of known history of violent ideation , Lack of known access to firearms , Sense of community, availability/access to resources and support , Sense of optimism, hope , Interpersonal competence , Affect regulation , Sense of self-efficacy, internal locus of control , and Positive, pro-social family/peer network   Treatment Plan:  There are no recently modified care plans to display for this patient.      Attestation Statements   Portion Spent on Counseling & Coordination of Care: N/A - E&M coding by complexity of care.

## 2023-11-20 NOTE — GROUP NOTE
Group Topic: Coping Skills   Group Date: 11/20/2023  Start Time: 11:00 AM  End Time: 12:00 PM  Facilitators: MANAN Shah   Department: [facility]        Name: Cheyenne Wolf YOB: 1990   MR: [id number]      This was a video IOP group on a HIPAA compliant platform. All patients were provided with informed consent.    Date: 11//23, Time: 11AM-12PM group, Number of Patients: 3, User Name: [email or web address]  Number of Staff: 1    Group topic(s): Codependency continued & qualities of a healthy relationships  Shared “green lights' in relations and forgiveness worksheets. Reviewed assertive communication with group and provided examples.  Patient given the opportunity to ask questions and share responses.   Cheyenne was present, attentive and participated in group discussions.      Facilitator: MANAN Babb

## 2023-11-20 NOTE — GROUP NOTE
"Group Topic: Coping Skills   Group Date: 11/20/2023  Start Time:  9:00 AM  End Time: 10:00 AM  Facilitators: JACKY Shaw   Department: [facility]        Name: Cheyenne Wolf YOB: 1990   MR: [id number]      This was a video IOP group on a HIPAA compliant platform. All patients were provided with informed consent.     Date: 11/20/2023, Time: 9-10a, Number of Patients: 3, User Name: [email or web address],  Number of Staff: 1  Group topic(s): Check in with feelings identification. Discussion about unproductive and productive worries and coping strategies. Group began with patients checking in noticing their current emotions and sharing about related behaviors, body sensations associated with the emotion. Group then discussed differences between productive and unproductive worries, ways to cope with worry thoughts (Ie. socratic questioning, DBT TIPP skills.) Cheyenne introduced herself to the group- shared feeling anxious about starting IOP. She felt using the socratic question \"what would I tell a friend?\" Could help her cope with worry about her pregnancy.    MANAN Bragg  "

## 2023-11-21 ENCOUNTER — CLINICAL SUPPORT (OUTPATIENT)
Dept: BEHAVIORAL HEALTH | Facility: CLINIC | Age: 33
End: 2023-11-21
Payer: COMMERCIAL

## 2023-11-21 DIAGNOSIS — F41.1 GAD (GENERALIZED ANXIETY DISORDER): ICD-10-CM

## 2023-11-21 DIAGNOSIS — F43.10 PTSD (POST-TRAUMATIC STRESS DISORDER): ICD-10-CM

## 2023-11-21 PROCEDURE — 90837 PSYTX W PT 60 MINUTES: CPT | Mod: AJ,95 | Performed by: SOCIAL WORKER

## 2023-11-22 ENCOUNTER — APPOINTMENT (OUTPATIENT)
Dept: OBSTETRICS AND GYNECOLOGY | Facility: CLINIC | Age: 33
End: 2023-11-22
Payer: COMMERCIAL

## 2023-11-24 ENCOUNTER — DOCUMENTATION (OUTPATIENT)
Dept: BEHAVIORAL HEALTH | Facility: CLINIC | Age: 33
End: 2023-11-24
Payer: COMMERCIAL

## 2023-11-27 ENCOUNTER — TELEMEDICINE (OUTPATIENT)
Dept: BEHAVIORAL HEALTH | Facility: CLINIC | Age: 33
End: 2023-11-27
Payer: COMMERCIAL

## 2023-11-27 ENCOUNTER — CLINICAL SUPPORT (OUTPATIENT)
Dept: BEHAVIORAL HEALTH | Facility: CLINIC | Age: 33
End: 2023-11-27
Payer: COMMERCIAL

## 2023-11-27 DIAGNOSIS — F43.10 POSTTRAUMATIC STRESS DISORDER: ICD-10-CM

## 2023-11-27 DIAGNOSIS — F41.1 GENERALIZED ANXIETY DISORDER: ICD-10-CM

## 2023-11-27 DIAGNOSIS — F41.1 GAD (GENERALIZED ANXIETY DISORDER): ICD-10-CM

## 2023-11-27 DIAGNOSIS — F43.10 PTSD (POST-TRAUMATIC STRESS DISORDER): ICD-10-CM

## 2023-11-27 PROCEDURE — 90853 GROUP PSYCHOTHERAPY: CPT | Mod: AJ,95 | Performed by: SOCIAL WORKER

## 2023-11-27 PROCEDURE — 3008F BODY MASS INDEX DOCD: CPT | Performed by: STUDENT IN AN ORGANIZED HEALTH CARE EDUCATION/TRAINING PROGRAM

## 2023-11-27 PROCEDURE — 99214 OFFICE O/P EST MOD 30 MIN: CPT | Performed by: STUDENT IN AN ORGANIZED HEALTH CARE EDUCATION/TRAINING PROGRAM

## 2023-11-27 RX ORDER — FLUOXETINE HYDROCHLORIDE 20 MG/1
20 CAPSULE ORAL DAILY
Qty: 30 CAPSULE | Refills: 1 | Status: SHIPPED | OUTPATIENT
Start: 2023-11-27 | End: 2024-01-29

## 2023-11-27 RX ORDER — FLUOXETINE HYDROCHLORIDE 40 MG/1
40 CAPSULE ORAL DAILY
Qty: 30 CAPSULE | Refills: 1 | Status: SHIPPED | OUTPATIENT
Start: 2023-11-27 | End: 2024-02-26 | Stop reason: SDUPTHER

## 2023-11-27 NOTE — PROGRESS NOTES
Subjective    All Individuals Present: Patient and Provider (Encounter Provider)     ID: Cheyenne Wolf is a 34 y.o. female with history of KENJI and PTSD presenting to  IOP.     Interval History/HPI/PFSH:  @16wga    Got through holiday okay. Looking forward to appointment with Dr. Helms. Will be in [id number] and relieved that she will be near hospital if something goes wrong.    Staying more active with Pilates.    Denies SI, HI, AVH.     Medication side effects: None Reported     Review of Systems  Constitutional: Negative  Psychiatric: Positive for anxiety, fatigue, and phobia , Negative for depression, irritability, mood swings, and sleep disturbance  Neurological: Negative   Other: pregnant @16wga    Objective   LMP 2023 (Exact Date)   Wt Readings from Last 4 Encounters:   24 124 kg (272 lb 9.6 oz)   24 121 kg (267 lb 11.2 oz)   24 123 kg (271 lb)   23 122 kg (270 lb)       Mental Status Exam  General Appearance: Well groomed, appropriate eye contact.  Attitude/Behavior: Cooperative, conversant, engaged, and with good eye contact.  Motor: No psychomotor agitation or retardation, no tremor or other abnormal movements.  Speech: Normal rate, volume, prosody  Gait/Station: Within normal limits  Mood: anxious.  Affect: Constricted, Anxious, and Congruent with mood and topic of conversation  Thought Process: Linear, goal directed  Thought Associations: No loosening of associations  Thought Content: normal  Sensorium: Alert and oriented to person, place, time and situation  Insight: Intact, as evidenced by awareness of symptom patterns, introspection  Judgment: Intact  Cognition: Cognitively intact to conversational testing with respect to attention, orientation, fund of knowledge, recent and remote memory, and language.  Testing: N/A.    Laboratory/Imaging/Diagnostic Tests       Assessment/Plan   Overall Formulation and Differential Diagnosis:  Cheyenne Wolf is a 34 y.o. female  who meets criteria for KENJI and PTSD.  Interval Assessment:   @16wga presenting to IOP d/t worsening anxiety and hypervigilance in pregnancy. Had previously been on fluoxetine to fair effect after traumatic birth experience with first child. Was switched to sertraline with current pregnancy by outside provider, has noted worsening anxiety and no purported benefit from such. Will try transitioning back to fluoxetine given prior efficacy. Discussed limited data regarding fluoxetine, and discussed risks, benefits, and alternatives in context of pregnancy and postpartum, and she was agreeable.    Plan:  -discontinue sertraline  -continue fluoxetine 40 mg PO daily, plan increase to 60 mg next week  -Reviewed treatment goals listed in treatment plan and pt progressing towards these goals as seen by engagement in groups.  -Continue Intensive Outpatient Program.     Risk Assessment:  Imminent Risk of Suicide or Serious Self-Injury: Low Risk -- Risk factors include: History of trauma or abuse  Protective factors include:Denies current suicidal ideation, Denies history of suicide attempts , Future-oriented talk , Willingness to seek help and support , Skills in problem solving, conflict resolution, and nonviolent handling of disputes, Cultural and Yazidi beliefs that discourage suicide and support self-preservation , Access to a variety of clinical interventions , Receiving and engaged in care for mental, physical, and substance use disorders , History of adhering to treatment recommendations and/or prescribed medication regimen , Support through ongoing medical and mental healthcare relationships , Current/history of good response to treatment/meds , Interpersonal relationships and supports, e.g., family, friends, peers, community , and Restricted access to firearms or other lethal means of suicide   Imminent Risk of Violence or Homicide: Low Risk - Risk factors include: No significant risk factors identified on  screening. Protective factors include: Lack of known history of harm to others , Lack of known history of violent ideation , Lack of known access to firearms , Sense of community, availability/access to resources and support , Sense of optimism, hope , Interpersonal competence , Affect regulation , Sense of self-efficacy, internal locus of control , and Positive, pro-social family/peer network   Treatment Plan:  There are no recently modified care plans to display for this patient.      Attestation Statements   Portion Spent on Counseling & Coordination of Care: N/A - E&M coding by complexity of care.

## 2023-11-27 NOTE — GROUP NOTE
Group Topic: Coping Skills   Group Date: 11/27/2023  Start Time: 10:00 AM  End Time: 11:00 AM  Facilitators: MANAN Shah   Department: [facility]        Name: Cheyenne Wolf YOB: 1990   MR: [id number]    This was a video IOP group on a HIPAA compliant platform. All patients were provided with informed consent.  Date: 11/27/23, Time: 10am-11pm group, Number of Patients:3, User Name: MANAN jamison  Number of Staff: 1    Group topic(s): Self-care and stress management   Spoke with group about their weekend and recent holiday. Provide psychoeducation on stress and its physical/mental impact. Reviewed self-care tips and sleep hygiene. Patient discussing managing care giver stress and their traumatic experiences.  Cheyenne was present, attentive.   Facilitator: MANAN Babb

## 2023-11-27 NOTE — GROUP NOTE
Group Topic: Coping Skills   Group Date: 11/27/2023  Start Time: 11:00 AM  End Time: 12:00 PM  Facilitators: MANAN Shah   Department: [facility]        Name: Cheyenne Wolf YOB: 1990   MR: [id number]      Date: 11/27, Time: 11AM-12PM group, Number of Patients: 3, User Name: [email or web address]  Number of Staff: 1    Group topic(s): Stress management and self-care continued   Group completing stress exploration worksheet . Reviewed behavioral scheduling and healthy vs unhealthy coping skills. Guided mediation done with the group. Patient given the opportunity to ask questions and share responses. Cheyenne was present, attentive and participated in group discussions.      Facilitator: MANAN Babb

## 2023-11-28 ENCOUNTER — CLINICAL SUPPORT (OUTPATIENT)
Dept: BEHAVIORAL HEALTH | Facility: CLINIC | Age: 33
End: 2023-11-28
Payer: COMMERCIAL

## 2023-11-28 DIAGNOSIS — F43.10 PTSD (POST-TRAUMATIC STRESS DISORDER): ICD-10-CM

## 2023-11-28 DIAGNOSIS — F41.1 GAD (GENERALIZED ANXIETY DISORDER): ICD-10-CM

## 2023-11-28 PROCEDURE — 90853 GROUP PSYCHOTHERAPY: CPT | Mod: AJ,95 | Performed by: SOCIAL WORKER

## 2023-11-28 NOTE — GROUP NOTE
"Group Topic: Feeling Awareness/Expression   Group Date: 11/28/2023  Start Time:  9:00 AM  End Time: 10:00 AM  Facilitators: JACKY Shaw   Department: [facility]        Name: Cheyenne Wolf YOB: 1990   MR: [id number]      This was a video IOP group on a HIPAA compliant platform. All patients were provided with informed consent.     Date: 11/28/2023, Time: 9-10a, Number of Patients: 3, User Name: [email or web address],  Number of Staff: 1  Group topic(s): Check in, introduction to emotion regulation. Patients checked in about their emotions, stressors. Reflected on remaining thoughts about groups from the day prior. Patients were then introduced to DBT emotion regulation module- what emotions are and why we have them. Cheyenne shared feeling somewhat flustered. She reflected on the \"myths about emotions\" worksheet, identifying with the myth \"being emotional means being out of control.\"    MANAN Bragg  "

## 2023-11-30 ENCOUNTER — APPOINTMENT (OUTPATIENT)
Dept: BEHAVIORAL HEALTH | Facility: CLINIC | Age: 33
End: 2023-11-30
Payer: COMMERCIAL

## 2023-11-30 NOTE — GROUP NOTE
Group Topic: Coping Skills   Group Date: 11/28/2023  Start Time: 11:00 AM  End Time: 12:00 PM  Facilitators: JACKY Shaw; MANAN Shah   Department: [facility]        Name: Cheyenne Wolf YOB: 1990   MR: [id number]      This was a video IOP group on a HIPAA compliant platform. All patients were provided with informed consent.     Date: 11/28/2023, Time: 11a-12p, Number of Patients: 3, User Name: [email or web address],  Number of Staff: 2  Group topic(s): Emotion regulation (DBT), continued. Group continued to learn and discuss application of various DBT emotion regulation skills, including checking the facts/challenging cognitive distortions. Patients also discussed ways to accumulate positive emotions through pleasurable activities. Cheyenne shared wanting to increase time reading for pleasure; also shared about self-soothing activities that evoke positive emotions.  MANAN Bragg

## 2023-11-30 NOTE — GROUP NOTE
Group Topic: Coping Skills   Group Date: 11/28/2023  Start Time: 10:00 AM  End Time: 11:00 AM  Facilitators: JACKY Shaw; MANAN Shah   Department: [facility]        Name: Cheyenne Wolf YOB: 1990   MR: [id number]      This was a video IOP group on a HIPAA compliant platform. All patients were provided with informed consent.     Date: 11/28/2023, Time: 10-11a, Number of Patients: 3, User Name: [email or web address],  Number of Staff: 2  Group topic(s): Emotion regulation (DBT), continued. Group continued with psychoeducation about the model of emotions from DBT- components of emotions and common associated action urges. Also learned about the skill of opposite action. Discussed various examples of opposite action. Cheyenne shared about using opposite action for social anxiety.  MANAN Bragg     No

## 2023-12-01 ENCOUNTER — DOCUMENTATION (OUTPATIENT)
Dept: BEHAVIORAL HEALTH | Facility: CLINIC | Age: 33
End: 2023-12-01
Payer: COMMERCIAL

## 2023-12-01 NOTE — PROGRESS NOTES
INTENSIVE OUTPATIENT PROGRAM MULTIDISCIPLINARY  TREATMENT PLAN & PROGRESS NOTE     Patient: Cheyenne Wolf   : 1990  Age:   33   Student: No  Treatment Team Date: 2023       MRN# [id number]    Psychiatrist: Dr. Christian Lopez  Date of IOP Admission: 2023  Ref by: Saige Tejada              Week  1                        Admission Scores:   BLANCA 21:  10 ANDRY: 29  BDI: 26  PHQ-9: 13 MAST: 0 DAST: 0  MDQ:  Negative [place]: 19     Current Risk Assessment:  Suicidal Risk:      None: X    Ideation:       Plan:      Intent:      SI Plan with plan contracts for safety:     Hx of harming self:        Homicidal Risk:  None:  X Ideation:       Plan:      Intent:      HI Plan with means:                                     Hx of harming others:          Global Improvement    Clinical Global Impressions Rating Scale Of Illness:  5  1-No Illness Present   2-Minimal   3-Mild   4-Moderate   5-Marked  X  6-Severe   7-Very Severe     Diagnoses & ICD-10 Codes  Primary Diagnosis & Code: F41.1    Secondary Diagnosis & Code: F43.10     Psychosocial & Environmental Stressors:   Financial  insecurity :   Medication X  Family/Home life: X  Work/School:    Inadequacy of social support   Medical/Physical health: X     Patient's Treatment Goals:            Date Initiated:            Progress Update:  2023  1.  Attend and actively participate in IOP _3_ days/week for 3 hours per day   2023 Good      Fair X   Poor     Unclear   2.  Attend weekly medication management sessions and maintain compliance of medications  2023   Good  X  Fair     Poor     Unclear                                                        2.  Identify symptoms of diagnoses and develop coping plans    2023 Good  X  Fair     Poor     Unclear                                                 3.  Increase illness education and symptom insight                  2023 Good  X  Fair     Poor     Unclear        Current  medications:  See EMR chart        Progress note:  _X_New to the IOP program, attending as planned  __Compliant, Progressing & Improving - Needs more time in IOP therapy program   __Compliant, Progressing & Improving Planning Discharge   __Compliant, Not Progressing or Improving: Reason  __Non-Compliant, not progressing or improving: Plan  __Other:     Insurance & Benefits: Name of Insurance: Medical [employer] [employer], [place], BENEFITS ARE BASED ON MEDICAL NECESSITY ONLY: Unlimited visits, covers 80 % of the contracted rate after deductible has been met. Plan limit of 1:1.     Co-Pay per day: $0    DEDUCTIBLE        Single:  5,000 Family: 10,000 Accumulation:  Single: 882.55 Family: 903.16  CO- INSURANCE  Single:  / Family:  / Accumulation:  Single:   Family:   OUT OF POCKET  Single:  7,200 Family: 14,400 Accumulation:  Single: 1,012.17Family: 1,292.29     Total IOP Sessions completed & authorized to date:  1    Discharge plans have been discussed with patient & Dr. PAMELA Lopez on:   Reason for discharge:    ___Successfully completed IOP treatment:   ___Pt. decided to seek treatment elsewhere:   ___Dropped out or no show more than three times:  ___Benefits exhausted:   ___Other:    Discharge date:                   Discharge Prognosis: Excellent      Good     Fair     Poor    Follow up appointment with: Physician:   Follow up appointment with: Therapist:    Follow up Aftercare group?  Yes __   No__     Patient provided with Crisis Hotline phone number for suicide prevention? Yes __ No __    Discharge Scores: Not Completed     PHQ-9:    Anxiety:    Work/School:    Social Life:    Family Life/Home Resp:     Treatment plan electronically signed by Treatment Team:   LOLI Lopez MD, MANAN Hines J. Wyse, LISW

## 2023-12-01 NOTE — PROGRESS NOTES
INTENSIVE OUTPATIENT PROGRAM MULTIDISCIPLINARY  TREATMENT PLAN & PROGRESS NOTE     Patient: Cheyenne Wolf   : 1990  Age:   33   Student: No  Treatment Team Date: 2023       MRN# [id number]    Psychiatrist: Dr. Christian Lopez  Date of IOP Admission: 2023  Ref by: Saige Tejada              Week  2                        Admission Scores:   BLANCA 21:  10 ANDRY: 29  BDI: 26  PHQ-9: 13 MAST: 0 DAST: 0  MDQ:  Negative [place]: 19     Current Risk Assessment:  Suicidal Risk:      None: X    Ideation:       Plan:      Intent:      SI Plan with plan contracts for safety:     Hx of harming self:        Homicidal Risk:  None:  X Ideation:       Plan:      Intent:      HI Plan with means:                                     Hx of harming others:          Global Improvement    Clinical Global Impressions Rating Scale Of Illness:  5  1-No Illness Present   2-Minimal   3-Mild   4-Moderate   5-Marked  X  6-Severe   7-Very Severe     Diagnoses & ICD-10 Codes  Primary Diagnosis & Code: F41.1    Secondary Diagnosis & Code: F43.10     Psychosocial & Environmental Stressors:   Financial  insecurity :   Medication X  Family/Home life: X  Work/School:    Inadequacy of social support   Medical/Physical health: X     Patient's Treatment Goals:            Date Initiated:            Progress Update:  2023  1.  Attend and actively participate in IOP _3_ days/week for 3 hours per day   2023 Good   X   Fair    Poor     Unclear   2.  Attend weekly medication management sessions and maintain compliance of medications  2023   Good  X  Fair     Poor     Unclear                                                        2.  Identify symptoms of diagnoses and develop coping plans    2023 Good  X  Fair     Poor     Unclear                                                 3.  Increase illness education and symptom insight                  2023 Good  X  Fair     Poor     Unclear        Current  medications:  See EMR chart        Progress note:  __New to the IOP program, attending as planned  _X_Compliant, Progressing & Improving - Needs more time in IOP therapy program   __Compliant, Progressing & Improving Planning Discharge   __Compliant, Not Progressing or Improving: Reason  __Non-Compliant, not progressing or improving: Plan  __Other:     Insurance & Benefits: Name of Insurance: Medical [place] [employer], [place], BENEFITS ARE BASED ON MEDICAL NECESSITY ONLY: Unlimited visits, covers 80 % of the contracted rate after deductible has been met. Plan limit of 1:1.     Co-Pay per day: $0    DEDUCTIBLE        Single:  5,000 Family: 10,000 Accumulation:  Single: 882.55 Family: 903.16  CO- INSURANCE  Single:  / Family:  / Accumulation:  Single:   Family:   OUT OF POCKET  Single:  7,200 Family: 14,400 Accumulation:  Single: 1,012.17Family: 1,292.29     Total IOP Sessions completed & authorized to date:  3    Discharge plans have been discussed with patient & Dr. PAMELA Lopez on:   Reason for discharge:    ___Successfully completed IOP treatment:   ___Pt. decided to seek treatment elsewhere:   ___Dropped out or no show more than three times:  ___Benefits exhausted:   ___Other:    Discharge date:                   Discharge Prognosis: Excellent      Good     Fair     Poor    Follow up appointment with: Physician:   Follow up appointment with: Therapist:    Follow up Aftercare group?  Yes __   No__     Patient provided with Crisis Hotline phone number for suicide prevention? Yes __ No __    Discharge Scores: Not Completed     PHQ-9:    Anxiety:    Work/School:    Social Life:    Family Life/Home Resp:     Treatment plan electronically signed by Treatment Team:   LOLI Lopez MD, ALEXEY HinesS, JACKY Maher-S

## 2023-12-04 ENCOUNTER — CLINICAL SUPPORT (OUTPATIENT)
Dept: BEHAVIORAL HEALTH | Facility: CLINIC | Age: 33
End: 2023-12-04
Payer: COMMERCIAL

## 2023-12-04 ENCOUNTER — TELEMEDICINE (OUTPATIENT)
Dept: BEHAVIORAL HEALTH | Facility: CLINIC | Age: 33
End: 2023-12-04
Payer: COMMERCIAL

## 2023-12-04 DIAGNOSIS — F43.10 PTSD (POST-TRAUMATIC STRESS DISORDER): ICD-10-CM

## 2023-12-04 DIAGNOSIS — F41.1 GAD (GENERALIZED ANXIETY DISORDER): ICD-10-CM

## 2023-12-04 PROCEDURE — 3008F BODY MASS INDEX DOCD: CPT | Performed by: STUDENT IN AN ORGANIZED HEALTH CARE EDUCATION/TRAINING PROGRAM

## 2023-12-04 PROCEDURE — 99214 OFFICE O/P EST MOD 30 MIN: CPT | Performed by: STUDENT IN AN ORGANIZED HEALTH CARE EDUCATION/TRAINING PROGRAM

## 2023-12-04 PROCEDURE — 90853 GROUP PSYCHOTHERAPY: CPT | Mod: AJ,95 | Performed by: SOCIAL WORKER

## 2023-12-04 NOTE — GROUP NOTE
Group Topic: Feeling Awareness/Expression   Group Date: 12/4/2023  Start Time: 10:00 AM  End Time: 11:00 AM  Facilitators: JACKY Shaw   Department: [facility]        Name: Cheyenne Wolf YOB: 1990   MR: [id number]      This was a video IOP group on a HIPAA compliant platform. All patients were provided with informed consent.     Date: 12/4/2023, Time: 10-11a, Number of Patients: 4, User Name: [email or web address],  Number of Staff: 1    Group Topic(s): Mom Power (week 6) continued. Patients received psychoeducation about co-regulation and how the parent's role in this changes across the child's development. Patients then discussed the role of past traumas in shaping their perceptions and their parenting. Patients identified negative behaviors and beliefs related to childhood trauma they did not want to carry into their parenting, and positive ones they did want to carry into their parenting. Facilitator led guided imagery (container) exercise to conclude the session. Cheyenne shared fears she will replicate some negative/unhealthy patterns in her own parenting due to past traumas. She was able to identify at the same time several positive messages and experiences she wants to provide her children.   JACKY Bragg-S

## 2023-12-04 NOTE — GROUP NOTE
Group Topic: Coping Skills   Group Date: 12/4/2023  Start Time: 11:00 AM  End Time: 12:00 PM  Facilitators: MANAN Shah   Department: [facility]        Name: Cheyenne Wolf YOB: 1990   MR: [id number]      This was a video IOP group on a HIPAA compliant platform. All patients were provided with informed consent.    Date: 12/4/23, Time: 11AM-12PM group, Number of Patients: 4, User Name: [email or web address]  Number of Staff: 1    Group topic(s): Stages of change  Provided psychoeducation on stages of change. Shared educational video on the stages of change with the group. Discussed with group what changes they would like to make and the benefits. Reviewed motivational interview change template. Cheyenne was present, attentive and participated in group discussions.      Facilitator: MANAN Babb
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
## 2023-12-04 NOTE — GROUP NOTE
Group Topic: Feeling Awareness/Expression   Group Date: 12/4/2023  Start Time:  9:00 AM  End Time: 10:00 AM  Facilitators: JACKY Shaw   Department: [facility]        Name: Cheyenne Wolf YOB: 1990   MR: [id number]      This was a video IOP group on a HIPAA compliant platform. All patients were provided with informed consent.     Date: 12/4/2023, Time: 9-10a, Number of Patients: 4, User Name: [email or web address],  Number of Staff: 1  Group topic(s): Check in and Mom Power Week 6. Group began with patients checking in and common ground icebreaker exercise. Patients began Mom Power week 6 - reflected on recent times they worked to restore their own and their child's emotional balance. Cheyenne shared about attempting to set limits with her child and also working to restore her own emotional balance.    JACKY Bragg-S

## 2023-12-05 ENCOUNTER — CLINICAL SUPPORT (OUTPATIENT)
Dept: BEHAVIORAL HEALTH | Facility: CLINIC | Age: 33
End: 2023-12-05
Payer: COMMERCIAL

## 2023-12-05 DIAGNOSIS — F41.1 GAD (GENERALIZED ANXIETY DISORDER): ICD-10-CM

## 2023-12-05 DIAGNOSIS — F43.10 PTSD (POST-TRAUMATIC STRESS DISORDER): ICD-10-CM

## 2023-12-05 PROCEDURE — 90853 GROUP PSYCHOTHERAPY: CPT | Mod: AJ,95 | Performed by: SOCIAL WORKER

## 2023-12-05 NOTE — GROUP NOTE
Group Topic: Feeling Awareness/Expression   Group Date: 12/5/2023  Start Time:  9:00 AM  End Time: 10:00 AM  Facilitators: JACKY Shaw   Department: [facility]        Name: Cheyenne Wolf YOB: 1990   MR: [id number]        This was a video IOP group on a HIPAA compliant platform. All patients were provided with informed consent.     Date: 12/5/2023, Time: 9-10a, Number of Patients: 4, User Name: [email or web address],  Number of Staff: 1  Group topic(s): Check in, review of group norms, Mom Power early foundations.  Group began with introductions and patients checking in about their mood and remaining feelings or reactions to the prior day's groups. Members reviewed group guidelines/rules for IOP participation. Members then learned about Mom Power early foundations and discussed qualities of secure attachment. Watched a video about the mother-child bond and discussion followed. Cheyenne shared about moments of connection and warmth with her son. She shared that speaking with the group about childhood trauma the day prior left her somewhat fatigued and drained but she practiced many coping skills.    ALEXEY BraggS

## 2023-12-05 NOTE — GROUP NOTE
Group Topic: Coping Skills   Group Date: 12/5/2023  Start Time: 11:00 AM  End Time: 12:00 PM  Facilitators: MANAN Shah   Department: [facility]        Name: Cheyenne Wolf YOB: 1990   MR: [id number]        This was a video IOP group on a HIPAA compliant platform. All patients were provided with informed consent.    Date: 12/5/23, Time: 11AM-12PM group, Number of Patients: 2, User Name: [email or web address]  Number of Staff: 1    Group topic(s): Values & Aspirations continued   Provided psychoeducation on aspirations. Group completing identifying aspirations worksheet. Group members given the opportunity to share their responses. Group discussing how gender roles have hindered them in achieving there goals/aspirations. Cheyenne was present, attentive and participated in group discussions.      Facilitator: MANAN Babb

## 2023-12-05 NOTE — GROUP NOTE
Group Topic: Coping Skills   Group Date: 12/5/2023  Start Time: 10:00 AM  End Time: 11:00 AM  Facilitators: MANAN Shah   Department: [facility]        Name: Cheyenne Wolf YOB: 1990   MR: [id number]      This was a video IOP group on a HIPAA compliant platform. All patients were provided with informed consent.  Date: 12/5/23, Time: 10am-11pm group, Number of Patients:3, User Name: [email or web address]  Number of Staff: 1    Group topic(s): Values & Aspirations   Provided psychoeducation on Values. Shared Values and meaning presentation with group. Included psychoeducation on values and their importance. Cheyenne was present, attentive.   Facilitator: MANAN Babb

## 2023-12-06 NOTE — PROGRESS NOTES
This session was conducted via HIPAA compliant video. Pt was provided with informed consent.    INDIVIDUAL PSYCHOTHERAPY    Time: 9:21-10:20am    Mental Status: Alert & Oriented x3    Hx of present illness: Pt endorses panic, difficulty controlling worry in relation to reminders of past trauma.    Diagnosis/problems: KENJI, PTSD    Method of therapy:  Supportive, goal setting/treatment planning, psychoeducation    Provider impressions/therapy summary: Met with  IOP pt individually in lieu of group due to low census. Discussed presenting symptoms and pt goals for treatment. Pt shared about her trauma reactions resulting from past birth trauma- impact on thoughts and perceptions, behavior, relationships. Pt described thinking of birth experiences in the forefront of her mind continuously with trouble controlling thoughts. Described self-blaming cognitions regarding the birth. Future choices are impacted- pt always wanted 3 children but fears having additional traumatic birth experiences in the future. Connected pt present evaluations of the experience, particularly regarding self-blame and shame, to past traumas from childhood and related negative core beliefs.     Identified goals/objectives: Goals for IOP- increase coping strategies for anxiety and panic, increase pt sense of support and connection with others experiencing  anxiety and depression, increase pt insight into illness and symptoms.    Response to therapy: Engaged    Treatment plan and process: Continue with above stated goals; follow up in IOP group and individual check ins while pt is engaged in IOP.    MANAN Bragg

## 2023-12-07 ENCOUNTER — CLINICAL SUPPORT (OUTPATIENT)
Dept: BEHAVIORAL HEALTH | Facility: CLINIC | Age: 33
End: 2023-12-07
Payer: COMMERCIAL

## 2023-12-07 DIAGNOSIS — F41.1 GAD (GENERALIZED ANXIETY DISORDER): ICD-10-CM

## 2023-12-07 DIAGNOSIS — F43.10 PTSD (POST-TRAUMATIC STRESS DISORDER): ICD-10-CM

## 2023-12-07 PROCEDURE — 90853 GROUP PSYCHOTHERAPY: CPT | Mod: AJ,95 | Performed by: SOCIAL WORKER

## 2023-12-07 NOTE — GROUP NOTE
Group Topic: Coping Skills   Group Date: 2023  Start Time: 10:00 AM  End Time: 11:00 AM  Facilitators: MANAN Shah   Department: [facility]        Name: Cheyenne Schwabols YOB: 1990   MR: [id number]      This was a video IOP group on a HIPAA compliant platform. All patients were provided with informed consent.  Date: 23, Time: 10am-11pm group, Number of Patients:4, User Name: [email or web address]  Number of Staff: 1    Group topic(s): Kathie Section E- Grief issues   Provided psychoeducation on grief following miscarriage and  loss. Discussed stages of grief and how to move forward in the process of grief. Shared expectations in grief with group. Cheyenne was present, attentive.   Facilitator: MANAN Babb

## 2023-12-07 NOTE — GROUP NOTE
Group Topic: Feeling Awareness/Expression   Group Date: 12/7/2023  Start Time:  9:00 AM  End Time: 10:00 AM  Facilitators: JACKY Shaw   Department: [facility]        Name: Cheyenne Wolf YOB: 1990   MR: [id number]      This was a video IOP group on a HIPAA compliant platform. All patients were provided with informed consent.     Date:12/7/2023, Time:9-10a, Number of Patients: 4, User Name: [email or web address],  Number of Staff: 1  Group topic(s): Check in, discussion about role transitions and expectations about pregnancy, birth and motherhood (inspired by WANDA curriculum). Patients began with a check in on their emotions, present stressors. Discussion ensued about societal expectations and myths about motherhood, pregnancy and birth that patients may have internalized. Patients discussed role transitions involved in having a baby. Cheyenne was engaged and participated in the discussion, sharing about her mixed feelings initially finding out she was pregnant.     ALEXEY BraggS

## 2023-12-07 NOTE — GROUP NOTE
Group Topic: Coping Skills   Group Date: 12/7/2023  Start Time: 11:00 AM  End Time: 12:00 PM  Facilitators: MANAN Shah   Department: [facility]        Name: Cheyenne Wolf YOB: 1990   MR: [id number]      This was a video IOP group on a HIPAA compliant platform. All patients were provided with informed consent.    Date: 12/7/23, Time: 11AM-12PM group, Number of Patients: 3, User Name: [email or web address]  Number of Staff: 1    Group topic(s): Grief continued and SMART Goals   Group discussion regarding loss of autonomy after motherhood and life change. Provided psychoeducation on aspirations. Provided psychoeducation on Smart Goals. Group member completing Smart goals worksheet. Group members give the opportunity to share goals for the upcoming weekend,. Cheyenne was present, attentive and participated in group discussions.      Facilitator: MANAN Babb

## 2023-12-08 ENCOUNTER — DOCUMENTATION (OUTPATIENT)
Dept: BEHAVIORAL HEALTH | Facility: CLINIC | Age: 33
End: 2023-12-08
Payer: COMMERCIAL

## 2023-12-11 ENCOUNTER — CLINICAL SUPPORT (OUTPATIENT)
Dept: BEHAVIORAL HEALTH | Facility: CLINIC | Age: 33
End: 2023-12-11
Payer: COMMERCIAL

## 2023-12-11 ENCOUNTER — TELEMEDICINE (OUTPATIENT)
Dept: BEHAVIORAL HEALTH | Facility: CLINIC | Age: 33
End: 2023-12-11
Payer: COMMERCIAL

## 2023-12-11 DIAGNOSIS — F41.1 GAD (GENERALIZED ANXIETY DISORDER): ICD-10-CM

## 2023-12-11 DIAGNOSIS — F43.10 PTSD (POST-TRAUMATIC STRESS DISORDER): ICD-10-CM

## 2023-12-11 PROCEDURE — 3008F BODY MASS INDEX DOCD: CPT | Performed by: STUDENT IN AN ORGANIZED HEALTH CARE EDUCATION/TRAINING PROGRAM

## 2023-12-11 PROCEDURE — 90853 GROUP PSYCHOTHERAPY: CPT | Mod: AJ,95 | Performed by: SOCIAL WORKER

## 2023-12-11 PROCEDURE — 99214 OFFICE O/P EST MOD 30 MIN: CPT | Performed by: STUDENT IN AN ORGANIZED HEALTH CARE EDUCATION/TRAINING PROGRAM

## 2023-12-11 RX ORDER — LORAZEPAM 1 MG/1
TABLET ORAL
Qty: 20 TABLET | Refills: 1 | Status: SHIPPED | OUTPATIENT
Start: 2023-12-11 | End: 2024-01-18 | Stop reason: SDUPTHER

## 2023-12-11 NOTE — GROUP NOTE
Group Topic: Coping Skills   Group Date: 12/11/2023  Start Time: 11:00 AM  End Time: 12:00 PM  Facilitators: MANAN Shah   Department: [facility]        Name: Cheyenne Wolf YOB: 1990   MR: [id number]      This was a video IOP group on a HIPAA compliant platform. All patients were provided with informed consent.    Date: 12/11/23, Time: 11AM-12PM group, Number of Patients: 3, User Name: [email or web address]  Number of Staff: 1    Group topic(s): Core beliefs continued & challenging negative thoughts   Spoke with group about the correlation between challenging negative thoughts and changing our core beliefs. Continued educational video on changing core beliefs. Group completing countering negative thoughts worksheet. Reviewed challenging negative thoughts worksheet with group. Discussed coping skills such as gratitude list, journaling and focusing on positive things from there day. Group members asked to identify their strengths. Cheyenne was present, attentive and participated in group discussions.      Facilitator: MANAN Babb

## 2023-12-11 NOTE — PROGRESS NOTES
INTENSIVE OUTPATIENT PROGRAM MULTIDISCIPLINARY  TREATMENT PLAN & PROGRESS NOTE     Patient: Cheyenne Wolf   : 1990  Age:   33   Student: No  Treatment Team Date: 2023       MRN# [id number]    Psychiatrist: Dr. Christian Lopez  Date of IOP Admission: 2023  Ref by: Saige Tejada              Week  3                       Admission Scores:   BLANCA 21:  10 ANDRY: 29  BDI: 26  PHQ-9: 13 MAST: 0 DAST: 0  MDQ:  Negative Street: 19     Current Risk Assessment:  Suicidal Risk:      None: X    Ideation:       Plan:      Intent:      SI Plan with plan contracts for safety:     Hx of harming self:        Homicidal Risk:  None:  X Ideation:       Plan:      Intent:      HI Plan with means:                                     Hx of harming others:          Global Improvement    Clinical Global Impressions Rating Scale Of Illness:  5  1-No Illness Present   2-Minimal   3-Mild   4-Moderate   5-Marked  X  6-Severe   7-Very Severe     Diagnoses & ICD-10 Codes  Primary Diagnosis & Code: F41.1    Secondary Diagnosis & Code: F43.10     Psychosocial & Environmental Stressors:   Financial  insecurity :   Medication X  Family/Home life: X  Work/School:    Inadequacy of social support   Medical/Physical health: X     Patient's Treatment Goals:            Date Initiated:            Progress Update:  2023  1.  Attend and actively participate in IOP _3_ days/week for 3 hours per day   2023 Good   X   Fair    Poor     Unclear   2.  Attend weekly medication management sessions and maintain compliance of medications  2023   Good  X  Fair     Poor     Unclear                                                        2.  Identify symptoms of diagnoses and develop coping plans    2023 Good  X  Fair     Poor     Unclear                                                 3.  Increase illness education and symptom insight                  2023 Good  X  Fair     Poor     Unclear        Current  medications:  See EMR chart        Progress note:  __New to the IOP program, attending as planned  _X_Compliant, Progressing & Improving - Needs more time in IOP therapy program   __Compliant, Progressing & Improving Planning Discharge   __Compliant, Not Progressing or Improving: Reason  __Non-Compliant, not progressing or improving: Plan  __Other:     Insurance & Benefits: Name of Insurance: [employer], [place] [employer], BENEFITS ARE BASED ON MEDICAL NECESSITY ONLY: Unlimited visits, covers 80 % of the contracted rate after deductible has been met. Plan limit of 1:1.     Co-Pay per day: $0    DEDUCTIBLE        Single:  5,000 Family: 10,000 Accumulation:  Single: 882.55 Family: 903.16  CO- INSURANCE  Single:  / Family:  / Accumulation:  Single:   Family:   OUT OF POCKET  Single:  7,200 Family: 14,400 Accumulation:  Single: 1,012.17Family: 1,292.29     Total IOP Sessions completed & authorized to date:  6    Discharge plans have been discussed with patient & Dr. PAMELA Lopez on:   Reason for discharge:    ___Successfully completed IOP treatment:   ___Pt. decided to seek treatment elsewhere:   ___Dropped out or no show more than three times:  ___Benefits exhausted:   ___Other:    Discharge date:                   Discharge Prognosis: Excellent      Good     Fair     Poor    Follow up appointment with: Physician:   Follow up appointment with: Therapist:    Follow up Aftercare group?  Yes __   No__     Patient provided with Crisis Hotline phone number for suicide prevention? Yes __ No __    Discharge Scores: Not Completed     PHQ-9:    Anxiety:    Work/School:    Social Life:    Family Life/Home Resp:     Treatment plan electronically signed by Treatment Team:   LOLI Lopez MD, ALEXEY HinesS, JACKY Maher-S
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
## 2023-12-11 NOTE — GROUP NOTE
Group Topic: Coping Skills   Group Date: 12/11/2023  Start Time: 10:00 AM  End Time: 11:00 AM  Facilitators: MANAN Shah   Department: [facility]        Name: Cheyenne Wolf YOB: 1990   MR: [id number]      This was a video IOP group on a HIPAA compliant platform. All patients were provided with informed consent.  Date: 12/11/23, Time: 10am-11pm group, Number of Patients:3, User Name: MANAN jamison  Number of Staff: 1    Group topic(s): Core Beliefs   Provided psychoeducation on core beliefs and how they are developed. Group completing core belief exploration worksheet. Played educational video on how to change our core beliefs to group. Group member were given the opportunity to share responses. Cheyenne was present and participated in group.  Facilitator: MANAN Babb

## 2023-12-11 NOTE — GROUP NOTE
Group Topic: Feeling Awareness/Expression   Group Date: 12/11/2023  Start Time:  9:00 AM  End Time: 10:00 AM  Facilitators: JACKY Shaw   Department: [facility]        Name: Cheyenne Wolf YOB: 1990   MR: [id number]        This was a video IOP group on a HIPAA compliant platform. All patients were provided with informed consent.     Date: 12/11/2023, Time: 9-10a, Number of Patients: 3, User Name: [email or web address],  Number of Staff: 1  Group topic(s): Check in, discussion about healthy and unhealthy supports. Group began with patients checking in about their current mood and stressors, sharing about progress on SMART goals set the week prior. Discussion ensued about qualities of healthy and unhealthy supports, navigating disclosure of pregnancy and/or mental health struggles and setting boundaries. 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 She spoke about experiences of invalidation and judgment from others about her pregnancy.    JACKY Bragg-S

## 2023-12-12 ENCOUNTER — CLINICAL SUPPORT (OUTPATIENT)
Dept: BEHAVIORAL HEALTH | Facility: CLINIC | Age: 33
End: 2023-12-12
Payer: COMMERCIAL

## 2023-12-12 ENCOUNTER — ROUTINE PRENATAL (OUTPATIENT)
Dept: OBSTETRICS AND GYNECOLOGY | Facility: HOSPITAL | Age: 33
End: 2023-12-12
Payer: COMMERCIAL

## 2023-12-12 VITALS — DIASTOLIC BLOOD PRESSURE: 85 MMHG | WEIGHT: 270 LBS | BODY MASS INDEX: 44.93 KG/M2 | SYSTOLIC BLOOD PRESSURE: 138 MMHG

## 2023-12-12 DIAGNOSIS — Z3A.18 18 WEEKS GESTATION OF PREGNANCY (HHS-HCC): ICD-10-CM

## 2023-12-12 DIAGNOSIS — O10.919 CHRONIC HYPERTENSION AFFECTING PREGNANCY (HHS-HCC): Primary | ICD-10-CM

## 2023-12-12 PROCEDURE — 0501F PRENATAL FLOW SHEET: CPT | Performed by: STUDENT IN AN ORGANIZED HEALTH CARE EDUCATION/TRAINING PROGRAM

## 2023-12-12 RX ORDER — MAGNESIUM 250 MG
1 TABLET ORAL NIGHTLY
COMMUNITY

## 2023-12-14 ENCOUNTER — CLINICAL SUPPORT (OUTPATIENT)
Dept: BEHAVIORAL HEALTH | Facility: CLINIC | Age: 33
End: 2023-12-14
Payer: COMMERCIAL

## 2023-12-14 DIAGNOSIS — F41.1 GAD (GENERALIZED ANXIETY DISORDER): ICD-10-CM

## 2023-12-14 DIAGNOSIS — F43.10 PTSD (POST-TRAUMATIC STRESS DISORDER): ICD-10-CM

## 2023-12-14 PROCEDURE — 90853 GROUP PSYCHOTHERAPY: CPT | Mod: AJ,95 | Performed by: SOCIAL WORKER

## 2023-12-14 NOTE — GROUP NOTE
Group Topic: Coping Skills   Group Date: 12/14/2023  Start Time: 11:00 AM  End Time: 12:00 PM  Facilitators: MANAN Shah   Department: [facility]        Name: Cheyenne Wolf YOB: 1990   MR: [id number]      This was a video IOP group on a HIPAA compliant platform. All patients were provided with informed consent.    Date: 12/14/23, Time: 11AM-12PM group, Number of Patients: 2, User Name: [email or web address]  Number of Staff: 1    Group topic(s): self-compassion continued   Completed exploring self-compassion through writing exercise. Group members providing feedback on activity. Patients discussing their personal progress in IOP and topics that stood out to them this week. Cheyenne was present, attentive and participated in group discussions.      Facilitator: MANAN Babb

## 2023-12-14 NOTE — GROUP NOTE
Group Topic: Coping Skills   Group Date: 12/14/2023  Start Time: 10:00 AM  End Time: 11:00 AM  Facilitators: MANAN Shah   Department: [facility]        Name: Cheyenne Wolf YOB: 1990   MR: [id number]      This was a video IOP group on a HIPAA compliant platform. All patients were provided with informed consent.  Date: 12/14/23, Time: 10am-11pm group, Number of Patients:2, User Name: MANAN jamison  Number of Staff: 1    Group topic(s): Self-Compassion   Completed self-compassion exercise self-compassion break. Group members giving feedback on exercise and their personal experiences. Cheyenne participated in group session.   Facilitator: MANAN Babb

## 2023-12-14 NOTE — GROUP NOTE
Group Topic: Coping Skills   Group Date: 12/14/2023  Start Time:  9:00 AM  End Time: 10:00 AM  Facilitators: JACKY Shaw   Department: [facility]        Name: Cheyenne Wolf YOB: 1990   MR: [id number]      This was a video IOP group on a HIPAA compliant platform. All patients were provided with informed consent.     Date: 12/14/2023, Time: 9-10a, Number of Patients: 3, User Name: [email or web address],  Number of Staff: 1  Group topic(s): Check in and introduction to self-compassion. Group began with patient check ins. Patients then received psychoeducation about self-compassion. Discussed the three pillars of self-compassion (from Emily Alba, PhD) and barriers to experiencing/practicing self-compassion. Cheyenne shared about her obgyn appointment with a new provider and feeling a sense of relief. Shared her sleep has improved the past 2 nights.     JACKY Bragg-S

## 2023-12-15 ENCOUNTER — DOCUMENTATION (OUTPATIENT)
Dept: BEHAVIORAL HEALTH | Facility: CLINIC | Age: 33
End: 2023-12-15
Payer: COMMERCIAL

## 2023-12-18 ENCOUNTER — TELEMEDICINE (OUTPATIENT)
Dept: BEHAVIORAL HEALTH | Facility: CLINIC | Age: 33
End: 2023-12-18
Payer: COMMERCIAL

## 2023-12-18 ENCOUNTER — CLINICAL SUPPORT (OUTPATIENT)
Dept: BEHAVIORAL HEALTH | Facility: CLINIC | Age: 33
End: 2023-12-18
Payer: COMMERCIAL

## 2023-12-18 DIAGNOSIS — F43.10 PTSD (POST-TRAUMATIC STRESS DISORDER): ICD-10-CM

## 2023-12-18 DIAGNOSIS — F41.1 GAD (GENERALIZED ANXIETY DISORDER): ICD-10-CM

## 2023-12-18 PROBLEM — I10 CHRONIC HYPERTENSION: Status: RESOLVED | Noted: 2023-10-02 | Resolved: 2023-12-18

## 2023-12-18 PROCEDURE — 3008F BODY MASS INDEX DOCD: CPT | Performed by: STUDENT IN AN ORGANIZED HEALTH CARE EDUCATION/TRAINING PROGRAM

## 2023-12-18 PROCEDURE — 99214 OFFICE O/P EST MOD 30 MIN: CPT | Performed by: STUDENT IN AN ORGANIZED HEALTH CARE EDUCATION/TRAINING PROGRAM

## 2023-12-18 PROCEDURE — 90853 GROUP PSYCHOTHERAPY: CPT | Mod: AJ,95 | Performed by: SOCIAL WORKER

## 2023-12-18 NOTE — GROUP NOTE
Group Topic: Coping Skills   Group Date: 12/18/2023  Start Time: 10:00 AM  End Time: 11:00 AM  Facilitators: MANAN Shah   Department: [facility]        Name: Cheyenne Wolf YOB: 1990   MR: [id number]      This was a video IOP group on a HIPAA compliant platform. All patients were provided with informed consent.  Date: 12/18/23, Time: 10am-11pm group, Number of Patients:3, User Name: MANAN jamison  Number of Staff: 1    Group topic(s): Boundaries   Provided psychoeducation on personal boundaries. Reviewed how to set boundaries and dealing with boundary violations. Group members given the opportunity to ask questions and comment on material. Cheyenne was present, attentive.   Facilitator: MANAN Babb

## 2023-12-18 NOTE — GROUP NOTE
Group Topic: Coping Skills   Group Date: 12/18/2023  Start Time: 11:00 AM  End Time: 12:00 PM  Facilitators: MANAN Shah   Department: [facility]        Name: Cheyenne Wolf YOB: 1990   MR: [id number]      This was a video IOP group on a HIPAA compliant platform. All patients were provided with informed consent.    Date: 12/18/23, Time: 11AM-12PM group, Number of Patients: 2, User Name: [email or web address]  Number of Staff: 1    Group topic(s): Boundaries continued   Group completing two boundaries exploration activities. Group members were given the opportunity to share responses. Cheyenne was present and participated in group discussions.      Facilitator: MANAN Babb

## 2023-12-18 NOTE — PROGRESS NOTES
Subjective   Patient ID [id number]   Cheyenne Wolf is a 33 y.o.  at 18w2d with a working estimated date of delivery of 2024, by Last Menstrual Period who presents for a routine prenatal visit. She denies vaginal bleeding, leakage of fluid, decreased fetal movements, or contractions.    Her pregnancy is complicated by:  Chronic hypertension with severe preeclampsia in prior pregnancy  Anxiety/PTSD related to prior pregnancy/birth    Objective   Physical Exam  Weight: 122 kg (270 lb)  Expected Total Weight Gain: 5 kg (11 lb)-9 kg (19 lb)   Pregravid BMI: 44.93  BP: 138/85  Fetal Heart Rate: 140 Fundal Height (cm): 23 cm      Assessment/Plan   Diagnoses and all orders for this visit:  Chronic hypertension affecting pregnancy  18 weeks gestation of pregnancy  BP well controlled with Nifed, continue current therapy  Continue prenatal vitamin.  Labs reviewed, all up to date and wnl.  Follow up in 4 weeks for a routine prenatal visit.    Bianca Helms MD

## 2023-12-18 NOTE — PROGRESS NOTES
Subjective    All Individuals Present: Patient and Provider (Encounter Provider)     ID: Cheyenne Wolf is a 34 y.o. female with history of KENJI and PTSD presenting to  IOP.     Interval History/HPI/PFSH:  @19wga    Had great transitional appt with ObGyn, feels comfortable.    Didn't do much last week d/t extended family having the flu.    No issues with fluoxetine.    BP has had better control as well, has been a major relief.    Denies SI, HI, AVH.     Medication side effects: None Reported     Review of Systems  Constitutional: Negative  Psychiatric: Positive for anxiety, fatigue, and phobia , Negative for depression, irritability, mood swings, and sleep disturbance  Neurological: Negative   Other: pregnant @19wga    Objective   LMP 2023 (Exact Date)   Wt Readings from Last 4 Encounters:   04/15/24 123 kg (270 lb 5.1 oz)   24 124 kg (272 lb 9.6 oz)   24 121 kg (267 lb 11.2 oz)   24 123 kg (271 lb)       Mental Status Exam  General Appearance: Well groomed, appropriate eye contact.  Attitude/Behavior: Cooperative, conversant, engaged, and with good eye contact.  Motor: No psychomotor agitation or retardation, no tremor or other abnormal movements.  Speech: Normal rate, volume, prosody  Gait/Station: Within normal limits  Mood: anxious.  Affect: Constricted, Anxious, and Congruent with mood and topic of conversation  Thought Process: Linear, goal directed  Thought Associations: No loosening of associations  Thought Content: normal  Sensorium: Alert and oriented to person, place, time and situation  Insight: Intact, as evidenced by awareness of symptom patterns, introspection  Judgment: Intact  Cognition: Cognitively intact to conversational testing with respect to attention, orientation, fund of knowledge, recent and remote memory, and language.  Testing: N/A.    Laboratory/Imaging/Diagnostic Tests       Assessment/Plan   Overall Formulation and Differential Diagnosis:  Cheyenne M  Maryann is a 34 y.o. female who meets criteria for KENJI and PTSD.  Interval Assessment:   @19wga presenting to IOP d/t worsening anxiety and hypervigilance in pregnancy. Had previously been on fluoxetine to fair effect after traumatic birth experience with first child. Was switched to sertraline with current pregnancy by outside provider, has noted worsening anxiety and no purported benefit from such. Will try transitioning back to fluoxetine given prior efficacy. Discussed limited data regarding fluoxetine, and discussed risks, benefits, and alternatives in context of pregnancy and postpartum, and she was agreeable.    Plan:  -discontinue sertraline  -continue fluoxetine 60 mg next week  -increase lorazepam to 1 mg PO daily PRN  -Reviewed treatment goals listed in treatment plan and pt progressing towards these goals as seen by engagement in groups.  -Continue Intensive Outpatient Program.     Risk Assessment:  Imminent Risk of Suicide or Serious Self-Injury: Low Risk -- Risk factors include: History of trauma or abuse  Protective factors include:Denies current suicidal ideation, Denies history of suicide attempts , Future-oriented talk , Willingness to seek help and support , Skills in problem solving, conflict resolution, and nonviolent handling of disputes, Cultural and Druze beliefs that discourage suicide and support self-preservation , Access to a variety of clinical interventions , Receiving and engaged in care for mental, physical, and substance use disorders , History of adhering to treatment recommendations and/or prescribed medication regimen , Support through ongoing medical and mental healthcare relationships , Current/history of good response to treatment/meds , Interpersonal relationships and supports, e.g., family, friends, peers, community , and Restricted access to firearms or other lethal means of suicide   Imminent Risk of Violence or Homicide: Low Risk - Risk factors include: No  significant risk factors identified on screening. Protective factors include: Lack of known history of harm to others , Lack of known history of violent ideation , Lack of known access to firearms , Sense of community, availability/access to resources and support , Sense of optimism, hope , Interpersonal competence , Affect regulation , Sense of self-efficacy, internal locus of control , and Positive, pro-social family/peer network   Treatment Plan:  There are no recently modified care plans to display for this patient.      Attestation Statements   Portion Spent on Counseling & Coordination of Care: N/A - E&M coding by complexity of care.

## 2023-12-18 NOTE — GROUP NOTE
Group Topic: Community   Group Date: 2023  Start Time:  9:00 AM  End Time: 10:00 AM  Facilitators: JACKY Shaw   Department: [facility]        Name: Cheyenne Wolf YOB: 1990   MR: [id number]      This was a video IOP group on a HIPAA compliant platform. All patients were provided with informed consent.     Date: 2023, Time: 9-10a, Number of Patients: 5, User Name: [email or web address],  Number of Staff: 1  Group topic(s): Check in, review of group norms, WANDA LOFTON - introduction to discussing relationships in  period.  Group members checked in about a high and low from the weekend and moment of growth or change from the weekend. Group briefly reviewed attendance policy and confidentiality policies. Group then was led in discussion from WANDA LOFTON curriculum about relationships and conflict in the  period, evaluating relationships. Cheyenne shared her communication has improved with her spouse recently. A high was doing holiday decorations with her son.    MANAN Bragg

## 2023-12-19 ENCOUNTER — CLINICAL SUPPORT (OUTPATIENT)
Dept: BEHAVIORAL HEALTH | Facility: CLINIC | Age: 33
End: 2023-12-19
Payer: COMMERCIAL

## 2023-12-19 DIAGNOSIS — F41.1 GAD (GENERALIZED ANXIETY DISORDER): ICD-10-CM

## 2023-12-19 DIAGNOSIS — F43.10 PTSD (POST-TRAUMATIC STRESS DISORDER): ICD-10-CM

## 2023-12-19 PROCEDURE — 90853 GROUP PSYCHOTHERAPY: CPT | Mod: AJ,95 | Performed by: SOCIAL WORKER

## 2023-12-19 NOTE — GROUP NOTE
Group Topic: Coping Skills   Group Date: 12/19/2023  Start Time: 11:00 AM  End Time: 12:00 PM  Facilitators: MANAN Shah   Department: [facility]        Name: Cheyenne Wolf YOB: 1990   MR: [id number]      This was a video IOP group on a HIPAA compliant platform. All patients were provided with informed consent.    Date: 12/19/23, Time: 11AM-12PM group, Number of Patients: 4, User Name: [email or web address]  Number of Staff: 2    Group topic(s): Mom Power week 3 continued   Reviewed sunshine time activities. Group members shared time they spent with their children this week. Discussed as group how it felt to spend sunshine time with their children. Members sharing barriers to sunshine time. Problem solved ways in group to overcome barriers. Cheyenne was present and participated in group discussions.     Facilitator: MANAN Babb  Co-Facilitator MANAN Kenney

## 2023-12-20 ENCOUNTER — APPOINTMENT (OUTPATIENT)
Dept: OBSTETRICS AND GYNECOLOGY | Facility: CLINIC | Age: 33
End: 2023-12-20
Payer: COMMERCIAL

## 2023-12-20 NOTE — GROUP NOTE
"Group Topic: Coping Skills   Group Date: 12/19/2023  Start Time:  9:00 AM  End Time: 10:00 AM  Facilitators: JACKY Shaw   Department: [facility]        Name: Cheyenne Wolf YOB: 1990   MR: [id number]      This was a video IOP group on a HIPAA compliant platform. All patients were provided with informed consent.     Date: 12/19/2023, Time: 9-10a, Number of Patients: 4, User Name: [email or web address],  Number of Staff: 1  Group topic(s): Check in, discussion about coping with holiday stress, psychoeducation about self-soothing. Group members checked in about their mood, current stressors, parenting or pregnancy issues. Group members identified behaviors, thoughts and body sensations that indicate their stress level is rising and that using skills for relaxation or distress tolerance could benefit them. Patients received psychoeducation about self-soothing skills (material taken from DBT, \"seeking safety\"). Were guided in \"safe place\" guided imagery exercise. Cheyenne shared she gets redness on her chest when she is starting to feel stressed or anxious. Shared about using sounds and music for self-soothing. Pt also checked in briefly with provider before group started about her discharge date- pt shared feeling well and would like her discharge date to be 12/28/2023.    JACKY Bragg-S       "

## 2023-12-21 ENCOUNTER — CLINICAL SUPPORT (OUTPATIENT)
Dept: BEHAVIORAL HEALTH | Facility: CLINIC | Age: 33
End: 2023-12-21
Payer: COMMERCIAL

## 2023-12-21 ENCOUNTER — DOCUMENTATION (OUTPATIENT)
Dept: BEHAVIORAL HEALTH | Facility: CLINIC | Age: 33
End: 2023-12-21
Payer: COMMERCIAL

## 2023-12-21 NOTE — PROGRESS NOTES
[facility] staff met with pt. to discuss discharge planning and explore next steps for treatment. Pt. Will be discharged from [facility] as of 2023. Pt. plans to follow up with  this writer for individual therapy and with Dr. Lopez for medication management. Pt. was offered information and education on discharge and   [facility] Aftercare programing. Pt. completed CSSRS and was deemed low risk. Please see CSSRS below.    SAFE-T Protocol with C-SSRS  STEP 1: Identify Risk Factors  In the past month:  1) Wish to be dead -denies  2) Current suicidal thoughts -denies  3) Suicidal thoughts with method -denies  4) Suicidal intent without specific plan -denies  5) Intent with plan -denies     C-SSRS Suicidal Behavior:  Have you ever done anything, started to do anything, or prepared to do anything to end your life?  Lifetime:denies  Past 3 months:denies      STEP 4: Guidelines to determine level of risk and develop interventions to lower risk level  High:  Moderate:  Low: X    MANAN Bragg  , [facility]

## 2023-12-22 ENCOUNTER — DOCUMENTATION (OUTPATIENT)
Dept: BEHAVIORAL HEALTH | Facility: CLINIC | Age: 33
End: 2023-12-22
Payer: COMMERCIAL

## 2023-12-26 ENCOUNTER — APPOINTMENT (OUTPATIENT)
Dept: BEHAVIORAL HEALTH | Facility: CLINIC | Age: 33
End: 2023-12-26
Payer: COMMERCIAL

## 2023-12-26 ENCOUNTER — ANCILLARY PROCEDURE (OUTPATIENT)
Dept: RADIOLOGY | Facility: CLINIC | Age: 33
End: 2023-12-26
Payer: COMMERCIAL

## 2023-12-26 DIAGNOSIS — Z34.90 ENCOUNTER FOR SUPERVISION OF NORMAL PREGNANCY, UNSPECIFIED, UNSPECIFIED TRIMESTER (HHS-HCC): ICD-10-CM

## 2023-12-26 PROCEDURE — 76811 OB US DETAILED SNGL FETUS: CPT | Performed by: OBSTETRICS & GYNECOLOGY

## 2023-12-26 PROCEDURE — 76811 OB US DETAILED SNGL FETUS: CPT

## 2023-12-27 DIAGNOSIS — O35.9XX0 KNOWN FETAL ANOMALY, ANTEPARTUM, SINGLE OR UNSPECIFIED FETUS (HHS-HCC): Primary | ICD-10-CM

## 2023-12-28 ENCOUNTER — DOCUMENTATION (OUTPATIENT)
Dept: BEHAVIORAL HEALTH | Facility: CLINIC | Age: 33
End: 2023-12-28
Payer: COMMERCIAL

## 2023-12-28 ENCOUNTER — CLINICAL SUPPORT (OUTPATIENT)
Dept: BEHAVIORAL HEALTH | Facility: CLINIC | Age: 33
End: 2023-12-28
Payer: COMMERCIAL

## 2023-12-28 DIAGNOSIS — F41.1 GAD (GENERALIZED ANXIETY DISORDER): ICD-10-CM

## 2023-12-28 DIAGNOSIS — F43.10 PTSD (POST-TRAUMATIC STRESS DISORDER): ICD-10-CM

## 2023-12-28 PROCEDURE — 90853 GROUP PSYCHOTHERAPY: CPT | Mod: AJ,95 | Performed by: SOCIAL WORKER

## 2023-12-28 NOTE — PROGRESS NOTES
INTENSIVE OUTPATIENT PROGRAM MULTIDISCIPLINARY  TREATMENT PLAN & PROGRESS NOTE     Patient: Cheyenne Wolf   : 1990  Age:   33   Student: No  Treatment Team Date: 2023       MRN# [id number]    Psychiatrist: Dr. Christian Lopez  Date of IOP Admission: 2023  Ref by: Saige Tejada              Week  5                       Admission Scores:   BLANCA 21:  10 ANDRY: 29  BDI: 26  PHQ-9: 13 MAST: 0 DAST: 0  MDQ:  Negative [place]: 19     Current Risk Assessment:  Suicidal Risk:      None: X    Ideation:       Plan:      Intent:      SI Plan with plan contracts for safety:     Hx of harming self:        Homicidal Risk:  None:  X Ideation:       Plan:      Intent:      HI Plan with means:                                     Hx of harming others:          Global Improvement    Clinical Global Impressions Rating Scale Of Illness:  4  1-No Illness Present   2-Minimal   3-Mild   4-Moderate X  5-Marked    6-Severe   7-Very Severe     Diagnoses & ICD-10 Codes  Primary Diagnosis & Code: F41.1    Secondary Diagnosis & Code: F43.10     Psychosocial & Environmental Stressors:   Financial  insecurity :   Medication X  Family/Home life: X  Work/School:    Inadequacy of social support   Medical/Physical health: X     Patient's Treatment Goals:            Date Initiated:            Progress Update:  2023  1.  Attend and actively participate in IOP _3_ days/week for 3 hours per day   2023 Good   X   Fair    Poor     Unclear   2.  Attend weekly medication management sessions and maintain compliance of medications  2023   Good  X  Fair     Poor     Unclear                                                        2.  Identify symptoms of diagnoses and develop coping plans    2023 Good  X  Fair     Poor     Unclear                                                 3.  Increase illness education and symptom insight                  2023 Good  X  Fair     Poor     Unclear        Current  medications:  See EMR chart        Progress note:  __New to the IOP program, attending as planned  __Compliant, Progressing & Improving - Needs more time in IOP therapy program   _X_Compliant, Progressing & Improving Planning Discharge   __Compliant, Not Progressing or Improving: Reason  __Non-Compliant, not progressing or improving: Plan  __Other:     Insurance & Benefits: Name of Insurance: Medical [place] [employer], [place] NETWORK, BENEFITS ARE BASED ON MEDICAL NECESSITY ONLY: Unlimited visits, covers 80 % of the contracted rate after deductible has been met. Plan limit of 1:1.     Co-Pay per day: $0    DEDUCTIBLE        Single:  5,000 Family: 10,000 Accumulation:  Single: 882.55 Family: 903.16  CO- INSURANCE  Single:  / Family:  / Accumulation:  Single:   Family:   OUT OF POCKET  Single:  7,200 Family: 14,400 Accumulation:  Single: 1,012.17Family: 1,292.29     Total IOP Sessions completed & authorized to date:  10    Discharge plans have been discussed with patient & Dr. PAMELA Lopez on:   Reason for discharge:    ___Successfully completed IOP treatment:   ___Pt. decided to seek treatment elsewhere:   ___Dropped out or no show more than three times:  ___Benefits exhausted:   ___Other:    Discharge date:                   Discharge Prognosis: Excellent      Good     Fair     Poor    Follow up appointment with: Physician:   Follow up appointment with: Therapist:    Follow up Aftercare group?  Yes __   No__     Patient provided with Crisis Hotline phone number for suicide prevention? Yes __ No __    Discharge Scores: Not Completed     PHQ-9:    Anxiety:    Work/School:    Social Life:    Family Life/Home Resp:     Treatment plan electronically signed by Treatment Team:   LOLI Lopez MD, ALEXEY HinesS, JACKY Maher-S

## 2023-12-28 NOTE — PROGRESS NOTES
INTENSIVE OUTPATIENT PROGRAM MULTIDISCIPLINARY  TREATMENT PLAN & PROGRESS NOTE     Patient: Cheyenne Wolf   : 1990  Age:   33   Student: No  Treatment Team Date: 2023 (none this week due to holiday)       MRN# [id number]    Psychiatrist: Dr. Christian Lopez  Date of IOP Admission: 2023  Ref by: Saige Tejada              Week  6                       Admission Scores:   BLANCA 21:  10 ANDRY: 29  BDI: 26  PHQ-9: 13 MAST: 0 DAST: 0  MDQ:  Negative [place]: 19     Current Risk Assessment:  Suicidal Risk:      None: X    Ideation:       Plan:      Intent:      SI Plan with plan contracts for safety:     Hx of harming self:        Homicidal Risk:  None:  X Ideation:       Plan:      Intent:      HI Plan with means:                                     Hx of harming others:          Global Improvement    Clinical Global Impressions Rating Scale Of Illness:  4  1-No Illness Present   2-Minimal   3-Mild   4-Moderate X  5-Marked    6-Severe   7-Very Severe     Diagnoses & ICD-10 Codes  Primary Diagnosis & Code: F41.1    Secondary Diagnosis & Code: F43.10     Psychosocial & Environmental Stressors:   Financial  insecurity :   Medication X  Family/Home life: X  Work/School:    Inadequacy of social support   Medical/Physical health: X     Patient's Treatment Goals:            Date Initiated:            Progress Update:  2023  1.  Attend and actively participate in IOP _3_ days/week for 3 hours per day   2023 Good   X   Fair    Poor     Unclear   2.  Attend weekly medication management sessions and maintain compliance of medications  2023   Good  X  Fair     Poor     Unclear                                                        2.  Identify symptoms of diagnoses and develop coping plans    2023 Good  X  Fair     Poor     Unclear                                                 3.  Increase illness education and symptom insight                  2023 Good  X  Fair      Poor     Unclear        Current medications:  See EMR chart        Progress note:  __New to the IOP program, attending as planned  __Compliant, Progressing & Improving - Needs more time in IOP therapy program   _X_Compliant, Progressing & Improving Planning Discharge   __Compliant, Not Progressing or Improving: Reason  __Non-Compliant, not progressing or improving: Plan  __Other:     Insurance & Benefits: Name of Insurance: Medical [place] [employer], [place] NETWORK, BENEFITS ARE BASED ON MEDICAL NECESSITY ONLY: Unlimited visits, covers 80 % of the contracted rate after deductible has been met. Plan limit of 1:1.     Co-Pay per day: $0    DEDUCTIBLE        Single:  5,000 Family: 10,000 Accumulation:  Single: 882.55 Family: 903.16  CO- INSURANCE  Single:  / Family:  / Accumulation:  Single:   Family:   OUT OF POCKET  Single:  7,200 Family: 14,400 Accumulation:  Single: 1,012.17Family: 1,292.29     Total IOP Sessions completed & authorized to date:  12    Discharge plans have been discussed with patient & Dr. PAMELA Lopez on: 12/29/2023  Reason for discharge:    _X__Successfully completed IOP treatment:   ___Pt. decided to seek treatment elsewhere:   ___Dropped out or no show more than three times:  ___Benefits exhausted:   ___Other:    Discharge date:  12/28/2023                 Discharge Prognosis: Excellent      Good  X   Fair     Poor    Follow up appointment with: Physician: Pending with Dr. Christian Lopez  Follow up appointment with: Therapist: Shilpa Cotto, January 4th 2023  Follow up Aftercare group?  Yes _X_   No__     Patient provided with Crisis Hotline phone number for suicide prevention? Yes _X_ No __    Discharge Scores: Not Completed     PHQ-9:    Anxiety:    Work/School:    Social Life:    Family Life/Home Resp:     Treatment plan electronically signed by Treatment Team:   LOLI Lopez MD, MANAN Hines, MANAN Maher

## 2023-12-28 NOTE — PROGRESS NOTES
INTENSIVE OUTPATIENT PROGRAM MULTIDISCIPLINARY  TREATMENT PLAN & PROGRESS NOTE     Patient: Cheyenne Wolf   : 1990  Age:   33   Student: No  Treatment Team Date: 2023       MRN# [id number]    Psychiatrist: Dr. Christian Lopez  Date of IOP Admission: 2023  Ref by: Saige Tejada              Week  4                       Admission Scores:   BLANCA 21:  10 ANDRY: 29  BDI: 26  PHQ-9: 13 MAST: 0 DAST: 0  MDQ:  Negative [place]: 19     Current Risk Assessment:  Suicidal Risk:      None: X    Ideation:       Plan:      Intent:      SI Plan with plan contracts for safety:     Hx of harming self:        Homicidal Risk:  None:  X Ideation:       Plan:      Intent:      HI Plan with means:                                     Hx of harming others:          Global Improvement    Clinical Global Impressions Rating Scale Of Illness:  4  1-No Illness Present   2-Minimal   3-Mild   4-Moderate X  5-Marked    6-Severe   7-Very Severe     Diagnoses & ICD-10 Codes  Primary Diagnosis & Code: F41.1    Secondary Diagnosis & Code: F43.10     Psychosocial & Environmental Stressors:   Financial  insecurity :   Medication X  Family/Home life: X  Work/School:    Inadequacy of social support   Medical/Physical health: X     Patient's Treatment Goals:            Date Initiated:            Progress Update:  12/15/2023  1.  Attend and actively participate in IOP _3_ days/week for 3 hours per day   2023 Good   X   Fair    Poor     Unclear   2.  Attend weekly medication management sessions and maintain compliance of medications  2023   Good  X  Fair     Poor     Unclear                                                        2.  Identify symptoms of diagnoses and develop coping plans    2023 Good  X  Fair     Poor     Unclear                                                 3.  Increase illness education and symptom insight                  2023 Good  X  Fair     Poor     Unclear        Current  medications:  See EMR chart        Progress note:  __New to the IOP program, attending as planned  _X_Compliant, Progressing & Improving - Needs more time in IOP therapy program   __Compliant, Progressing & Improving Planning Discharge   __Compliant, Not Progressing or Improving: Reason  __Non-Compliant, not progressing or improving: Plan  __Other:     Insurance & Benefits: Name of Insurance: [employer], [place] NETWORK, BENEFITS ARE BASED ON MEDICAL NECESSITY ONLY: Unlimited visits, covers 80 % of the contracted rate after deductible has been met. Plan limit of 1:1.     Co-Pay per day: $0    DEDUCTIBLE        Single:  5,000 Family: 10,000 Accumulation:  Single: 882.55 Family: 903.16  CO- INSURANCE  Single:  / Family:  / Accumulation:  Single:   Family:   OUT OF POCKET  Single:  7,200 Family: 14,400 Accumulation:  Single: 1,012.17Family: 1,292.29     Total IOP Sessions completed & authorized to date:  8    Discharge plans have been discussed with patient & Dr. PAMELA Lopez on:   Reason for discharge:    ___Successfully completed IOP treatment:   ___Pt. decided to seek treatment elsewhere:   ___Dropped out or no show more than three times:  ___Benefits exhausted:   ___Other:    Discharge date:                   Discharge Prognosis: Excellent      Good     Fair     Poor    Follow up appointment with: Physician:   Follow up appointment with: Therapist:    Follow up Aftercare group?  Yes __   No__     Patient provided with Crisis Hotline phone number for suicide prevention? Yes __ No __    Discharge Scores: Not Completed     PHQ-9:    Anxiety:    Work/School:    Social Life:    Family Life/Home Resp:     Treatment plan electronically signed by Treatment Team:   LOLI Lopez MD, ALEXEY HinesS, JACKY Maher-S

## 2023-12-29 ENCOUNTER — TELEMEDICINE (OUTPATIENT)
Dept: BEHAVIORAL HEALTH | Facility: CLINIC | Age: 33
End: 2023-12-29
Payer: COMMERCIAL

## 2023-12-29 DIAGNOSIS — F41.1 GAD (GENERALIZED ANXIETY DISORDER): ICD-10-CM

## 2023-12-29 DIAGNOSIS — F43.10 PTSD (POST-TRAUMATIC STRESS DISORDER): ICD-10-CM

## 2023-12-29 PROCEDURE — 3008F BODY MASS INDEX DOCD: CPT | Performed by: STUDENT IN AN ORGANIZED HEALTH CARE EDUCATION/TRAINING PROGRAM

## 2023-12-29 PROCEDURE — 99214 OFFICE O/P EST MOD 30 MIN: CPT | Performed by: STUDENT IN AN ORGANIZED HEALTH CARE EDUCATION/TRAINING PROGRAM

## 2023-12-29 NOTE — GROUP NOTE
Group Topic: Feeling Awareness/Expression   Group Date: 12/28/2023  Start Time:  9:00 AM  End Time: 10:00 AM  Facilitators: JACKY Shaw   Department: [facility]        Name: Cheyenne Wolf YOB: 1990   MR: [id number]        This was a video IOP group on a HIPAA compliant platform. All patients were provided with informed consent.     Date: 12/28/2023, Time: 9-10a, Number of Patients: 5, User Name: [email or web address],  Number of Staff: 1  Group topic(s): Check in, setting intentions and goals for the new year, gratitude.   Patients began with a check in on feelings and presenting symptoms, sharing with each other. Group was led in grounding exercise and invited to focus on gratitude. Group members also set an intention for the new year and set one goal using SMART framework. Pt was tearful sharing that she had an ultrasound recently and found out her baby may have a cleft lip. Pt was receptive to the support of her peers.    MANAN Bragg

## 2023-12-29 NOTE — GROUP NOTE
Group Topic: Coping Skills   Group Date: 12/28/2023  Start Time: 10:00 AM  End Time: 11:00 AM  Facilitators: JACKY Shaw; MANAN Shah   Department: [facility]    ?    Name: Cheyenne Wolf YOB: 1990   MR: [id number]      This was a video IOP group on a HIPAA compliant platform. All patients were provided with informed consent.     Date: 12/28/2023, Time: 10-11am, Number of Patients: 5, User Name: [email or web address],  Number of Staff: 2  Group topic(s): The cognitive model. Patients received psychoeducation about CBT and the cognitive model, cognitive triangle. Group defined emotions and components of emotions as well as automatic thoughts vs. Core beliefs. Group also took time to delight in members who were completing the program sharing about their growth while in IOP. Pt was receptive to others who shared about her progress in IOP. She shared feeling gratitude for the program. She sent a message to facilitator stating she wasn't feeling well and would be leaving group early at 11am.    MANAN Bragg LISW-S

## 2023-12-29 NOTE — PROGRESS NOTES
Subjective    All Individuals Present: Patient and Provider (Encounter Provider)     ID: Cheyenne Wolf is a 34 y.o. female with history of KENJI and PTSD presenting to  IOP.     Interval History/HPI/PFSH:  @wga    States she had a good Evelina, chacesweet with MiL's terminal illness.    Had US on  and some concern for possible cleft lip, stressed by this. Has repeat scan 24, has to meet with genetics.    Will celebrate wedding anniversary late (), excited to go on a date tonight.    Denies SI, HI, AVH.     Medication side effects: None Reported     Review of Systems  Constitutional: Negative  Psychiatric: Positive for anxiety, fatigue, and phobia , Negative for depression, irritability, mood swings, and sleep disturbance  Neurological: Negative   Other: pregnant @21wga    Objective   LMP 2023 (Exact Date)   Wt Readings from Last 4 Encounters:   04/15/24 123 kg (270 lb 5.1 oz)   24 124 kg (272 lb 9.6 oz)   24 121 kg (267 lb 11.2 oz)   24 123 kg (271 lb)       Mental Status Exam  General Appearance: Well groomed, appropriate eye contact.  Attitude/Behavior: Cooperative, conversant, engaged, and with good eye contact.  Motor: No psychomotor agitation or retardation, no tremor or other abnormal movements.  Speech: Normal rate, volume, prosody  Gait/Station: Within normal limits  Mood: anxious.  Affect: Constricted, Anxious, and Congruent with mood and topic of conversation  Thought Process: Linear, goal directed  Thought Associations: No loosening of associations  Thought Content: normal  Sensorium: Alert and oriented to person, place, time and situation  Insight: Intact, as evidenced by awareness of symptom patterns, introspection  Judgment: Intact  Cognition: Cognitively intact to conversational testing with respect to attention, orientation, fund of knowledge, recent and remote memory, and language.  Testing: N/A.    Laboratory/Imaging/Diagnostic Tests        Assessment/Plan   Overall Formulation and Differential Diagnosis:  Cheyenne Wolf is a 34 y.o. female who meets criteria for KENJI and PTSD.  Interval Assessment:   @21wga presenting to IOP d/t worsening anxiety and hypervigilance in pregnancy. Had previously been on fluoxetine to fair effect after traumatic birth experience with first child. Was switched to sertraline with current pregnancy by outside provider, has noted worsening anxiety and no purported benefit from such. Will try transitioning back to fluoxetine given prior efficacy. Discussed limited data regarding fluoxetine, and discussed risks, benefits, and alternatives in context of pregnancy and postpartum, and she was agreeable.    Plan:  -discontinue sertraline  -continue fluoxetine 60 mg next week  -continue lorazepam to 1 mg PO daily PRN  -Reviewed treatment goals listed in treatment plan and pt progressing towards these goals as seen by engagement in groups.  -Continue Intensive Outpatient Program.     Risk Assessment:  Imminent Risk of Suicide or Serious Self-Injury: Low Risk -- Risk factors include: History of trauma or abuse  Protective factors include:Denies current suicidal ideation, Denies history of suicide attempts , Future-oriented talk , Willingness to seek help and support , Skills in problem solving, conflict resolution, and nonviolent handling of disputes, Cultural and Orthodoxy beliefs that discourage suicide and support self-preservation , Access to a variety of clinical interventions , Receiving and engaged in care for mental, physical, and substance use disorders , History of adhering to treatment recommendations and/or prescribed medication regimen , Support through ongoing medical and mental healthcare relationships , Current/history of good response to treatment/meds , Interpersonal relationships and supports, e.g., family, friends, peers, community , and Restricted access to firearms or other lethal means of suicide    Imminent Risk of Violence or Homicide: Low Risk - Risk factors include: No significant risk factors identified on screening. Protective factors include: Lack of known history of harm to others , Lack of known history of violent ideation , Lack of known access to firearms , Sense of community, availability/access to resources and support , Sense of optimism, hope , Interpersonal competence , Affect regulation , Sense of self-efficacy, internal locus of control , and Positive, pro-social family/peer network   Treatment Plan:  There are no recently modified care plans to display for this patient.      Attestation Statements   Portion Spent on Counseling & Coordination of Care: N/A - E&M coding by complexity of care.

## 2024-01-02 ENCOUNTER — APPOINTMENT (OUTPATIENT)
Dept: BEHAVIORAL HEALTH | Facility: CLINIC | Age: 34
End: 2024-01-02
Payer: COMMERCIAL

## 2024-01-02 ENCOUNTER — TELEPHONE (OUTPATIENT)
Dept: OBSTETRICS AND GYNECOLOGY | Facility: HOSPITAL | Age: 34
End: 2024-01-02
Payer: COMMERCIAL

## 2024-01-02 DIAGNOSIS — O10.919 CHRONIC HYPERTENSION AFFECTING PREGNANCY (HHS-HCC): Primary | ICD-10-CM

## 2024-01-02 RX ORDER — NIFEDIPINE 60 MG/1
60 TABLET, FILM COATED, EXTENDED RELEASE ORAL
Qty: 30 TABLET | Refills: 3 | Status: SHIPPED | OUTPATIENT
Start: 2024-01-02 | End: 2024-04-08 | Stop reason: SDUPTHER

## 2024-01-02 NOTE — TELEPHONE ENCOUNTER
Patient wrote in via [employer] to report high BP readings with systolic's in the 140's  Patient is 21.2 wga and has history of sPEC in previous pregnancy  Patient currently on Nifedipine 30mg and is inquiring about increasing dose  Patient denies any headaches, vision changes, RUQ pain, swelling  Message sent to Dr. Lainez and agrees dose should be increased to 60mg  Medication sent to pharmacy  Patient aware  Instructed patient to continue checking pressures and monitor for any s/s of low/high BP and call the office/present to triage for evaluation  Patient verbalized understanding  Next prenatal visit scheduled for 1/16  Keerthi Gaytan RN

## 2024-01-03 ENCOUNTER — SOCIAL WORK (OUTPATIENT)
Dept: BEHAVIORAL HEALTH | Facility: CLINIC | Age: 34
End: 2024-01-03
Payer: COMMERCIAL

## 2024-01-03 DIAGNOSIS — F41.1 GENERALIZED ANXIETY DISORDER: ICD-10-CM

## 2024-01-03 PROCEDURE — 90853 GROUP PSYCHOTHERAPY: CPT

## 2024-01-03 NOTE — PROGRESS NOTES
After Care    LOI HOSPITALIST  History and Physical     Nanda Moseley Patient Status:  Inpatient    1954 MRN [id number]   [facility] 3SW-A Attending Catherine Caballero DO   Hosp Day # 0 PCP Nelson Vargas DO     Chief Complaint: Hypertension    Histo early signs, bilaterally, remained stable   • Osteoarthritis     left thumb, severe   • Osteomyelitis of right foot ([address] 01/2018    [place] ID, Dr. Linda Woody   • Osteoporosis    • Pain in joints    • Postmenopausal atrophic vaginitis    • Pulmonary fibrosis injections   • RADIATION RIGHT  2004    MAMMOSITE   • SPINE SURGERY PROCEDURE UNLISTED      L4-5   • TONSILLECTOMY     • TOTAL KNEE REPLACEMENT Left 05/24/2018   • TRANSFORAMINAL LUMBAR EPIDURAL STEROID INJECTION MULTIPLE LEVEL Right 11/10/2014    Performe 90 tablet, Rfl: 0  QUINAPRIL HCL 20 MG Oral Tab, TAKE 1 TABLET DAILY, Disp: 90 tablet, Rfl: 1  Denosumab 60 MG/ML Subcutaneous Solution Prefilled Syringe, Inject 1 mL (60 mg total) into the skin once.  Historical documentation - see Epic Immunization Activi Inhaler, Rfl: 1  Diclofenac Sodium (VOLTAREN) 1 % Transdermal Gel, Apply 4 g topically 4 (four) times daily. , Disp: 3 Tube, Rfl: 1  VITAMIN D3 2000 UNIT OR CAPS, 1 CAPSULE DAILY, Disp: , Rfl:   CALCIUM + D 600-200 MG-UNIT OR TABS, 1 TABLET TWICE DAILY WITH exacerbation  4. Elevated LDH  1. Zithro  2. PCT pending  3. Albuterol MDI  4. COVID pending  5. Isolation   5. Lupus  1. Plaquenil  6. Chronic OM  1.  Continue PTA abx    Quality:  · DVT Prophylaxis: Lovenox  · CODE status: Full  · [place]: No    Plan of car

## 2024-01-03 NOTE — GROUP NOTE
Group Topic: Coping Skills   Group Date: 1/3/2024  Start Time: 11:00 AM  End Time: 12:00 PM  Facilitators: MANAN Shah   Department: [facility]        Name: Cheyenne Wolf YOB: 1990   MR: [id number]      This was a video IOP group on a HIPAA compliant platform. All patients were provided with informed consent.  Date: 1/03/24, Time: 11am-12pm group, Number of Patients:6, User Name: MANAN jamison  Number of Staff: 1    Group topic(s): Behavioral Activation and Goal setting   Showed patients value identification worksheet. Group members commenting on what they want to focus on in the New Year. Provided psychoeducation on Behavioral Activation. Discussed barriers and healthy coping skills. Group completing CBT goal setting worksheet.  Group members given the opportunity to ask questions and comment on material. Cheyenne was present, attentive.   Facilitator: MANAN Babb

## 2024-01-04 ENCOUNTER — APPOINTMENT (OUTPATIENT)
Dept: BEHAVIORAL HEALTH | Facility: CLINIC | Age: 34
End: 2024-01-04
Payer: COMMERCIAL

## 2024-01-04 ENCOUNTER — SOCIAL WORK (OUTPATIENT)
Dept: BEHAVIORAL HEALTH | Facility: CLINIC | Age: 34
End: 2024-01-04
Payer: COMMERCIAL

## 2024-01-04 DIAGNOSIS — F41.1 GAD (GENERALIZED ANXIETY DISORDER): ICD-10-CM

## 2024-01-04 DIAGNOSIS — F43.10 PTSD (POST-TRAUMATIC STRESS DISORDER): ICD-10-CM

## 2024-01-04 PROCEDURE — 90837 PSYTX W PT 60 MINUTES: CPT | Mod: AJ,95 | Performed by: SOCIAL WORKER

## 2024-01-08 NOTE — PROGRESS NOTES
This session was conducted via HIPAA compliant video. Pt was provided with informed consent.    INDIVIDUAL PSYCHOTHERAPY    Time: 2:00-2:55pm    Mental Status: Alert & Oriented x3    Hx of present illness: Pt presents with anxiety (worry, panic) related to pregnancy.    Diagnosis/problems: PTSD, KENJI    Method of therapy: Trauma-informed, goal setting/treatment planning    Provider impressions/therapy summary: Meeting with pt for for individual therapy session after pt recently completed  IOP. Focus was on treatment planning on goal setting, assessment, orienting pt to individual therapy, and supporting pt with presenting concerns. Pt shared about recent ultrasound and worries about her baby's health as well as possible future interactions with providers. Pt goal for therapy is to increase focus on positive thoughts and emotions, minimize catastrophizing and focusing on scenarios that are out of her control. Discussed possible interventions including EMDR, psychoeducation, teaching affect regulation techniques. Current coping strategies pt is utilizing include going for walks, pilates, sticking to a daily routine, TIPP skill (DBT), practicing healthy communication with her support system.    Identified goals/objectives: Use of EMDR resourcing techniques to increase pt connection to inner strengths, positive memories, positive affect. Reinforce use of skills (DBT/CBT, Mom Power) that pt learned in IOP. Increase pt insight into illness through psychoeducation.    Response to therapy: Engaged    Treatment plan and process: Continue with above stated goals; follow up 1 week.    MANAN Bragg

## 2024-01-09 PROBLEM — N83.209 OVARIAN CYST: Status: RESOLVED | Noted: 2023-07-22 | Resolved: 2024-01-09

## 2024-01-09 PROBLEM — O09.299 HISTORY OF PRE-ECLAMPSIA IN PRIOR PREGNANCY, CURRENTLY PREGNANT (HHS-HCC): Status: ACTIVE | Noted: 2024-01-09

## 2024-01-09 PROBLEM — O28.3 ABNORMAL FETAL ULTRASOUND: Status: ACTIVE | Noted: 2024-01-09

## 2024-01-11 ENCOUNTER — INITIAL PRENATAL (OUTPATIENT)
Dept: MATERNAL FETAL MEDICINE | Facility: HOSPITAL | Age: 34
End: 2024-01-11
Payer: COMMERCIAL

## 2024-01-11 ENCOUNTER — HOSPITAL ENCOUNTER (OUTPATIENT)
Dept: RADIOLOGY | Facility: HOSPITAL | Age: 34
Discharge: HOME | End: 2024-01-11
Payer: COMMERCIAL

## 2024-01-11 DIAGNOSIS — O28.3 ABNORMAL FETAL ULTRASOUND: Primary | ICD-10-CM

## 2024-01-11 DIAGNOSIS — O35.9XX0 KNOWN FETAL ANOMALY, ANTEPARTUM, SINGLE OR UNSPECIFIED FETUS (HHS-HCC): ICD-10-CM

## 2024-01-11 PROCEDURE — 76816 OB US FOLLOW-UP PER FETUS: CPT | Performed by: MEDICAL GENETICS

## 2024-01-11 PROCEDURE — 76816 OB US FOLLOW-UP PER FETUS: CPT

## 2024-01-11 PROCEDURE — 99214 OFFICE O/P EST MOD 30 MIN: CPT | Mod: TH | Performed by: STUDENT IN AN ORGANIZED HEALTH CARE EDUCATION/TRAINING PROGRAM

## 2024-01-11 PROCEDURE — 99214 OFFICE O/P EST MOD 30 MIN: CPT | Performed by: STUDENT IN AN ORGANIZED HEALTH CARE EDUCATION/TRAINING PROGRAM

## 2024-01-11 NOTE — PROGRESS NOTES
FETAL CARE CLINIC    Maternal-Fetal Medicine Consultation Note  Initial Consultation within Fetal Care Clinic    Subjective   Patient ID [id number]   Cheyenne Wolf is a 33 y.o. year-old  at 22w4d by LMP consistent with 11-week US who presents for initial MFM consultation within fetal care clinic. She was referred by Dr. Helms for evaluation and management of the following fetal findings:     1) Unilateral cleft lip of the fetus  Unilateral cleft lip identified at anatomic survey on 2023 at 20 weeks gestation. The palate was not clearly visualized due to acoustic visualizations.     Her previous pregnancy was complicated by preeclampsia with severe features requiring delivery at 36 5/7 weeks gestation.     Past medical history: Chronic hypertension (currently on Nifedipine 30mg every day)  Surgical history: Denies  Obstetric history:  OB History          2    Para   1    Term   0       1    AB   0    Living   1         SAB   0    IAB   0    Ectopic   0    Multiple   0    Live Births   1                Social history:   Social History     Tobacco Use    Smoking status: Never    Smokeless tobacco: Never   Vaping Use    Vaping Use: Never used   Substance Use Topics    Alcohol use: Not Currently    Drug use: Never      Family history: No pertinent family history elicited    Objective   Physical Exam  Constitutional:       General: She is not in acute distress.     Appearance: Normal appearance. She is obese. She is not ill-appearing or toxic-appearing.   HENT:      Head: Normocephalic and atraumatic.   Cardiovascular:      Rate and Rhythm: Normal rate.   Pulmonary:      Effort: Pulmonary effort is normal. No respiratory distress.   Abdominal:      General: There is no distension.      Palpations: Abdomen is soft.      Tenderness: There is no abdominal tenderness. There is no guarding or rebound.   Neurological:      Mental Status: She is alert.        bpm by US performed 2024. The  following findings noted:   - Unilateral cleft lip is seen. The palate does not appear to be affected; however, cannot be definitively ruled out. Cortical development appears symmetric and appropriate for gestational age. No other facial abnormalities are identified. No cardiac abnormalities are identified.   - The previously noted umbilical varix persists. Measurements are stable from previous imaging.   See final report for additional information.       Assessment/Plan   Abnormal fetal ultrasound  Patient was counseled on today's findings. We discussed that, while today's imaging does not suggest cleft palate, we cannot definitively rule this out. Detailed examination is recommended following delivery to confirm these findings.   She was counseled that cleft lip can be associated with various genetic syndromes (e.g., Van jluis Woude (autosomal dominant, 1-2% of cases), Stickler syndrome (flat facies, hearing loss), 22q11 deletion, Treacher Mueller (micrognathia, dysplastic ears, deafness). Patient was originally scheduled for formal genetics counseling following today's visit; however, she would like to defer until her partner is able to be present. Formal genetic counseling to be provided at this follow up visit (Eunice Casanova CNM to arrange).     Recommend follow up visit at 30 weeks for fetal growth assessment.     Thank you for allowing us to participate in the care of your patient. We recommend follow up in fetal care clinic at approximately 30 weeks gestation. Recommend continued prenatal care with Dr. Helms in the interim. Do not hesitate to reach out with additional concerns and/or questions that may arise.    Patient seen and discussed with Dr. Lombardi.    Griselda Owen MD  Fellow, Maternal-Fetal Medicine     Genetics Attending Consultation Note:     I met with the patient and agree with Dr. Owen's evaluation and plan.  The patient is aware that although the ultrasound diagnosis is consistent  with isolated, unilateral cleft lip, a cleft lip + palate or more significant facial cleft cannot be excluded definitively.  Also, underlying genetic diagnosis cannot be definitively excluded although there are no signs of genetic diagnosis by our ultrasound evaluation.  Therefore, prognosis is expected to be very good.  Patient to meet with the craniofacial team for prenatal consultation.  I briefly described the  management including surgery for unilateral cleft lip.  I recommended that patient return in 3rd trimester for follow-up ultrasound.    Dr. Ashley Lombardi - Director of  Genetics

## 2024-01-11 NOTE — ASSESSMENT & PLAN NOTE
Patient was counseled on today's findings. We discussed that, while today's imaging does not suggest cleft palate, we cannot definitively rule this out. Detailed examination is recommended following delivery to confirm these findings.   She was counseled that cleft lip can be associated with various genetic syndromes (e.g., Van jluis Woude (autosomal dominant, 1-2% of cases), Stickler syndrome (flat facies, hearing loss), 22q11 deletion, Treacher Mueller (micrognathia, dysplastic ears, deafness). Patient was originally scheduled for formal genetics counseling following today's visit; however, she would like to defer until her partner is able to be present. Formal genetic counseling to be provided at this follow up visit (Eunice Casanova CNM to arrange).     Recommend follow up visit at 30 weeks for fetal growth assessment.

## 2024-01-12 ENCOUNTER — APPOINTMENT (OUTPATIENT)
Dept: BEHAVIORAL HEALTH | Facility: CLINIC | Age: 34
End: 2024-01-12
Payer: COMMERCIAL

## 2024-01-12 ENCOUNTER — TELEPHONE (OUTPATIENT)
Dept: OBSTETRICS AND GYNECOLOGY | Facility: HOSPITAL | Age: 34
End: 2024-01-12
Payer: COMMERCIAL

## 2024-01-15 PROBLEM — O35.AXX0: Status: ACTIVE | Noted: 2024-01-15

## 2024-01-16 ENCOUNTER — APPOINTMENT (OUTPATIENT)
Dept: OBSTETRICS AND GYNECOLOGY | Facility: HOSPITAL | Age: 34
End: 2024-01-16
Payer: COMMERCIAL

## 2024-01-17 ENCOUNTER — SOCIAL WORK (OUTPATIENT)
Dept: BEHAVIORAL HEALTH | Facility: CLINIC | Age: 34
End: 2024-01-17
Payer: COMMERCIAL

## 2024-01-17 ENCOUNTER — TELEPHONE (OUTPATIENT)
Dept: OBSTETRICS AND GYNECOLOGY | Facility: HOSPITAL | Age: 34
End: 2024-01-17
Payer: COMMERCIAL

## 2024-01-17 DIAGNOSIS — F43.10 PTSD (POST-TRAUMATIC STRESS DISORDER): ICD-10-CM

## 2024-01-17 PROCEDURE — 90853 GROUP PSYCHOTHERAPY: CPT

## 2024-01-17 NOTE — GROUP NOTE
Group Topic: Coping Skills   Group Date: 1/17/2024  Start Time: 11:00 AM  End Time: 12:00 PM  Facilitators: MANAN Shah   Department: [facility]        Name: Cheyenne Wolf YOB: 1990   MR: [id number]      This was a video IOP group on a HIPAA compliant platform. All patients were provided with informed consent.  Date: 1/17/4, Time: 11am-12pm group, Number of Patients:2, User Name: MANAN jamison  Number of Staff: 1    Group topic(s): Kathie CLARK &CARLOS Postpartum depression and role transition   Provided psychoeducation postpartum depression and baby blues. Reviewed expectations of women during pregnancy and after. Group members discussing what other women told them about pregnancy and caring for an infant. Spoke with group about role transitions after having a child and dealing with the changes in their lives. (Loss of productivity, freedom etc). Cheyenne was present, attentive.   Facilitator: MANAN Babb

## 2024-01-18 ENCOUNTER — SOCIAL WORK (OUTPATIENT)
Dept: BEHAVIORAL HEALTH | Facility: CLINIC | Age: 34
End: 2024-01-18
Payer: COMMERCIAL

## 2024-01-18 DIAGNOSIS — F41.1 GAD (GENERALIZED ANXIETY DISORDER): ICD-10-CM

## 2024-01-18 DIAGNOSIS — F43.10 PTSD (POST-TRAUMATIC STRESS DISORDER): ICD-10-CM

## 2024-01-18 PROCEDURE — 90837 PSYTX W PT 60 MINUTES: CPT | Mod: AJ,95 | Performed by: SOCIAL WORKER

## 2024-01-18 RX ORDER — LORAZEPAM 1 MG/1
TABLET ORAL
Qty: 20 TABLET | Refills: 1 | Status: SHIPPED | OUTPATIENT
Start: 2024-01-18 | End: 2024-02-26 | Stop reason: SDUPTHER

## 2024-01-19 NOTE — PROGRESS NOTES
This session was conducted via HIPAA compliant video. Pt was provided with informed consent.    INDIVIDUAL PSYCHOTHERAPY    Time: 2:00-2:55pm    Mental Status: Alert & Oriented x3    Hx of present illness: Pt endorses anxiety related to pregnancy and related health concerns.    Diagnosis/problems: PTSD, KENJI    Method of therapy: EMDR    Provider impressions/therapy summary: Discussed pt's anxieties about her recent ultrasound and confirmation her baby has a cleft lip. Pt notes experiencing tension in her body when discussing and focusing on next steps for prenatal and fetal care, what she feels is in her control. Anxiety about uncertainty in communication with providers, worries about not being heard. Guided pt in EMDR resourcing exercise identifying a quality she needs to meet the present challenges in pregnancy- pt identified quality of calm with memory of her wedding day. Installed resource with bilateral stimulation (eye movements) and pt endorsed positive affect as a result of exercise.    Identified goals/objectives: Focus on resourcing and stabilization with an EMDR lens - encouraging pt to focus on strengths, supports, positive inner resources and memories to draw upon.     Response to therapy: Engaged    Treatment plan and process: Continue with above stated goals; follow up 1 week.    MANAN Bragg

## 2024-01-23 ENCOUNTER — ROUTINE PRENATAL (OUTPATIENT)
Dept: OBSTETRICS AND GYNECOLOGY | Facility: HOSPITAL | Age: 34
End: 2024-01-23
Payer: COMMERCIAL

## 2024-01-23 ENCOUNTER — APPOINTMENT (OUTPATIENT)
Dept: OBSTETRICS AND GYNECOLOGY | Facility: HOSPITAL | Age: 34
End: 2024-01-23
Payer: COMMERCIAL

## 2024-01-23 VITALS — DIASTOLIC BLOOD PRESSURE: 84 MMHG | WEIGHT: 271 LBS | SYSTOLIC BLOOD PRESSURE: 144 MMHG | BODY MASS INDEX: 45.1 KG/M2

## 2024-01-23 DIAGNOSIS — O35.AXX0 FETAL CLEFT LIP AND PALATE AFFECTING ANTEPARTUM CARE OF MOTHER, SINGLE OR UNSPECIFIED FETUS (HHS-HCC): ICD-10-CM

## 2024-01-23 DIAGNOSIS — Z3A.24 24 WEEKS GESTATION OF PREGNANCY (HHS-HCC): ICD-10-CM

## 2024-01-23 DIAGNOSIS — O10.919 CHRONIC HYPERTENSION AFFECTING PREGNANCY (HHS-HCC): Primary | ICD-10-CM

## 2024-01-23 DIAGNOSIS — O09.299 HISTORY OF PRE-ECLAMPSIA IN PRIOR PREGNANCY, CURRENTLY PREGNANT (HHS-HCC): ICD-10-CM

## 2024-01-23 LAB
CREAT UR-MCNC: 48.9 MG/DL (ref 20–320)
PROT UR-ACNC: 5 MG/DL (ref 5–24)
PROT/CREAT UR: 0.1 MG/MG CREAT (ref 0–0.17)

## 2024-01-23 PROCEDURE — 0501F PRENATAL FLOW SHEET: CPT | Performed by: STUDENT IN AN ORGANIZED HEALTH CARE EDUCATION/TRAINING PROGRAM

## 2024-01-23 PROCEDURE — 82570 ASSAY OF URINE CREATININE: CPT | Performed by: STUDENT IN AN ORGANIZED HEALTH CARE EDUCATION/TRAINING PROGRAM

## 2024-01-23 NOTE — PROGRESS NOTES
Subjective   Patient ID [id number]   Cheyenne Wolf is a 33 y.o.  at 24w2d with a working estimated date of delivery of 2024, by Last Menstrual Period who presents for a routine prenatal visit. She denies vaginal bleeding, leakage of fluid, decreased fetal movements, or contractions.  Overall doing well since LV.  Was initially very stressed by US findings at anatomy scan with cleft lip/palate however subsequent imaging and counseling have helped to allay her concerns.  Home BP values have been WNL.  No preeclampsia symptoms currently.  Continues with counseling and this continues to be beneficial.    Her pregnancy is complicated by:  cHTN  Hx PEC  Fetal cleft lip and palate  Anxiety/ptsd related to prior birth    Objective   Physical Exam  Weight: 123 kg (271 lb)  Expected Total Weight Gain: 5 kg (11 lb)-9 kg (19 lb)   Pregravid BMI: 44.93  BP: 144/84  Fetal Heart Rate: 145 Fundal Height (cm): 28 cm    Imaging:  US OB follow UP transabdominal approach 2024  Narrative  Interpreted by: Ashley Lombardi  Indication  ========  F/U Fetal Abnormality Suspected. Class 3 Obesity (BMI > 40), Personal History of  Labor/Delivery (iatrogenic) in Previous Pregnancy, Hypertension, Benign Essential  History  ======  General History  Smoking: no  Previous Outcomes   2  Para 1  Pregnancies delivered  (P) 1  Living children (L) 1  Children born living <37w 1  Other: Vaginal Delivery  Pregnancy  =========  Hall pregnancy. Number of fetuses: 1  Dating  ======  LMP on: 2023  Cycle: Very certain LMP, regular cycle  GA by LMP 22 w + 4 d  CORRINA by LMP: 2024  Conception: LMP  Assigned: based on the LMP, selected on 10/25/2023  Assigned GA 22 w + 4 d  Assigned CORRINA: 2024  Fetal Growth Overview  =================  Exam date        GA              BPD (mm)          HC (mm)              AC (mm)               FL (mm)             HL (mm)            EFW (g)  2023        20w 2d         46.2     36%        174.7    28%        152.1     49%        31.3    23%        31.5     37%        331    34%  Impression  =========  Cheyenne Wolf presents for follow-up scan in a pregnancy complicated by the following: Class III obesity (BMI 46), History of  delivery (36 5/7), History of  preeclampsia with severe features, Chronic hypertension (on Nifedipine), and Suspected unilateral of the cleft lip. Patient completed cell-free DNA screening with  risk-reducing results this pregnancy.    Patient presents today to complete detailed anatomic views after cleft lip found on initial imaging.  - The following structures were, previously, not adequately visualized: 4-chamber, 3-vessel view, RVOT, LVOT, right diaphragm and lung, profile, nasal bone, mandible and  maxilla.  - The following structures were visualized and appear normal: 4-chamber, 3-vessel view, RVOT, LVOT, right diaphragm and lung, profile, nasal bone, mandible and maxilla.  - Unilateral cleft lip is, again, seen. The palate does not appear to be affected; however, cannot be definitively ruled out. Cortical development appears symmetric and  appropriate for gestational age. No other facial abnormalities are identified. No cardiac abnormalities are identified.  - The previously noted umbilical varix persists. Measurements are stable from previous imaging.  - No additional malformations are seen.    Patient was counseled on today's findings. We discussed that, while today's imaging does not suggest cleft palate, we cannot definitively rule this out. Detailed  examination is recommended following delivery to confirm these findings.  She was counseled that cleft lip can be associated with various genetic syndromes. Patient was originally scheduled for formal genetics counseling following today's visit;  however, she would like to defer until her partner is able to be present. Formal genetic counseling to be provided at this follow up  visit.    Recommend follow up visit at 30 weeks for fetal growth assessment.    Thank you for allowing us to participate in the care of your patient  Follow-up  ========    A repeat evaluation has been scheduled at 30 weeks.      Assessment/Plan   Problem List Items Addressed This Visit       Chronic hypertension affecting pregnancy - Primary    Overview     - nifedipine 30 initiated at 11 wga with good control since, continue current  -  testing and delivery ~38 weeks per MWH/ACOG guidance         Relevant Orders    Protein, Urine Random to be obtained today given mild range BP in office though low suspicion for developing PEC at this time given lack of sx and normal home BP trend    History of pre-eclampsia in prior pregnancy, currently pregnant    Overview     -  delivery at 36 5/7 for sPEC.   - continue ASA daily         Cleft lip and palate, fetal, affecting care of mother, antepartum    Overview     - unilateral cleft lip (left)  - palate appears unaffected on US but cannot be definitively ruled out  - MFM plan of care: for peds craniofacial consultation antepartum, MFM to coordinate         24 weeks gestation of pregnancy    Overview     [x] Dating: lmp c/w 11 wk US  [x] Initial BMI: 44  [x] Prenatal Labs: wnl  [x] Genetic Screening:  rr cf DNA  [x] Baby ASA: taking  [x] Anatomy US: wnl with exception of unilateral (L) cleft lip, palate appears unaffected but cannot be ruled out on US  [x] 1hr GCT at 24-28wks: ordered, to complete prior to NV  [] Tdap (27-36wks):  [x] Flu Shot: administered 10/25/23  [x] COVID vaccine: declines for now, counseled on rational for vaccination in pregnancy  [] GBS at 36 wks:  [] Breastfeeding  [x] Postpartum Birth control method: intends condoms, has been her preferred method  [x] Mode/timing of delivery: IOL 37-38 wga for cHTN on meds           Relevant Orders    Syphilis Screen with Reflex    CBC Anemia Panel With Reflex,Pregnancy    Glucose, 1 Hour Screen,  Pregnancy     Continue prenatal vitamin.  Follow up in 4 weeks for a routine prenatal visit.    Bianca Helms MD

## 2024-01-24 PROBLEM — O09.90 HIGH RISK PREGNANCY, ANTEPARTUM (HHS-HCC): Status: RESOLVED | Noted: 2023-10-02 | Resolved: 2024-01-24

## 2024-01-24 PROBLEM — O28.3 ABNORMAL FETAL ULTRASOUND: Status: RESOLVED | Noted: 2024-01-09 | Resolved: 2024-01-24

## 2024-01-24 PROBLEM — N83.299 OTHER OVARIAN CYST, UNSPECIFIED SIDE: Status: RESOLVED | Noted: 2023-10-02 | Resolved: 2024-01-24

## 2024-01-25 ENCOUNTER — APPOINTMENT (OUTPATIENT)
Dept: BEHAVIORAL HEALTH | Facility: CLINIC | Age: 34
End: 2024-01-25
Payer: COMMERCIAL

## 2024-01-29 DIAGNOSIS — F43.10 POSTTRAUMATIC STRESS DISORDER: ICD-10-CM

## 2024-01-29 DIAGNOSIS — F41.1 GENERALIZED ANXIETY DISORDER: ICD-10-CM

## 2024-01-29 RX ORDER — FLUOXETINE HYDROCHLORIDE 20 MG/1
20 CAPSULE ORAL DAILY
Qty: 30 CAPSULE | Refills: 1 | Status: SHIPPED | OUTPATIENT
Start: 2024-01-29 | End: 2024-03-11 | Stop reason: WASHOUT

## 2024-01-31 DIAGNOSIS — O35.9XX0 KNOWN FETAL ANOMALY, ANTEPARTUM, SINGLE OR UNSPECIFIED FETUS (HHS-HCC): Primary | ICD-10-CM

## 2024-02-02 ENCOUNTER — APPOINTMENT (OUTPATIENT)
Dept: BEHAVIORAL HEALTH | Facility: CLINIC | Age: 34
End: 2024-02-02
Payer: COMMERCIAL

## 2024-02-07 ENCOUNTER — SOCIAL WORK (OUTPATIENT)
Dept: BEHAVIORAL HEALTH | Facility: CLINIC | Age: 34
End: 2024-02-07
Payer: COMMERCIAL

## 2024-02-07 DIAGNOSIS — F43.10 PTSD (POST-TRAUMATIC STRESS DISORDER): ICD-10-CM

## 2024-02-07 PROCEDURE — 90853 GROUP PSYCHOTHERAPY: CPT

## 2024-02-08 NOTE — GROUP NOTE
Group Topic: Coping Skills   Group Date: 2/7/2024  Start Time: 11:00 AM  End Time: 12:00 PM  Facilitators: MANAN Shah   Department: [facility]        Name: Cheyenne Wolf YOB: 1990   MR: [id number]      This was a video IOP group on a HIPAA compliant platform. All patients were provided with informed consent.    Date: 2/7/24, Time: 11AM-12PM group, Number of Patients: 5, User Name: [email or web address]  Number of Staff: 1    Group topic(s): Mom Power week 6, Core beliefs and Boundaries   Played Shark video from [email or web address] curriculum. Debriefed with group regarding both videos. Discussed how their past experiences make parenting difficult. Reviewed Core Beliefs information worksheet. Per group request showed personal boundaries power point. Group members giving the opportunity to ask questions and give feedback. Cheyenne was present, attentive.     Facilitator: MANAN Babb

## 2024-02-09 ENCOUNTER — SOCIAL WORK (OUTPATIENT)
Dept: BEHAVIORAL HEALTH | Facility: CLINIC | Age: 34
End: 2024-02-09
Payer: COMMERCIAL

## 2024-02-09 DIAGNOSIS — F43.10 PTSD (POST-TRAUMATIC STRESS DISORDER): ICD-10-CM

## 2024-02-09 PROCEDURE — 90837 PSYTX W PT 60 MINUTES: CPT | Mod: AJ,95 | Performed by: SOCIAL WORKER

## 2024-02-09 NOTE — PROGRESS NOTES
This session was conducted via HIPAA compliant video. Pt was provided with informed consent.    INDIVIDUAL PSYCHOTHERAPY    Time: 11:05-11:58am    Mental Status: Alert & Oriented x3    Hx of present illness: Pt endorses some increase in nighttime anxiety.    Diagnosis/problems: PTSD, KENJI    Method of therapy: Supportive, creation of genogram    Provider impressions/therapy summary: Pt endorses improvements in ability to set limits and practice self-care, providing specific examples related to planning her son's birthday party. Pt shared about feeling grateful about connecting with friends who have confided in her about their postpartum mental health struggles. Processed feelings of guilt related to a friend from whom she distanced herself. Increased pt insight about pattern of caretaking others; created a genogram with pt to track intergenerational traumas and core beliefs as well as family dynamics. Pt also wished to meet with psychiatrist Dr. Lopez earlier than she was able to get appointment for in March, due to increase in anxiety - provider to reach out to him to see if a sooner appointment was available.    Identified goals/objectives: Continue with resourcing- guiding pt to connect with strengths and coping skills.    Response to therapy: Engaged    Treatment plan and process: Continue with above stated goals; follow up 1 week.    MANAN Bragg

## 2024-02-16 ENCOUNTER — APPOINTMENT (OUTPATIENT)
Dept: BEHAVIORAL HEALTH | Facility: CLINIC | Age: 34
End: 2024-02-16
Payer: COMMERCIAL

## 2024-02-20 ENCOUNTER — APPOINTMENT (OUTPATIENT)
Dept: OBSTETRICS AND GYNECOLOGY | Facility: HOSPITAL | Age: 34
End: 2024-02-20
Payer: COMMERCIAL

## 2024-02-26 ENCOUNTER — TELEMEDICINE (OUTPATIENT)
Dept: BEHAVIORAL HEALTH | Facility: CLINIC | Age: 34
End: 2024-02-26
Payer: COMMERCIAL

## 2024-02-26 DIAGNOSIS — F41.1 GAD (GENERALIZED ANXIETY DISORDER): ICD-10-CM

## 2024-02-26 DIAGNOSIS — F43.10 PTSD (POST-TRAUMATIC STRESS DISORDER): ICD-10-CM

## 2024-02-26 PROCEDURE — 99214 OFFICE O/P EST MOD 30 MIN: CPT | Performed by: STUDENT IN AN ORGANIZED HEALTH CARE EDUCATION/TRAINING PROGRAM

## 2024-02-26 PROCEDURE — 3008F BODY MASS INDEX DOCD: CPT | Performed by: STUDENT IN AN ORGANIZED HEALTH CARE EDUCATION/TRAINING PROGRAM

## 2024-02-26 RX ORDER — LORAZEPAM 1 MG/1
TABLET ORAL
Qty: 20 TABLET | Refills: 1 | Status: SHIPPED | OUTPATIENT
Start: 2024-02-26 | End: 2024-05-16

## 2024-02-26 RX ORDER — FLUOXETINE HYDROCHLORIDE 40 MG/1
80 CAPSULE ORAL DAILY
Qty: 60 CAPSULE | Refills: 1 | Status: SHIPPED | OUTPATIENT
Start: 2024-02-26 | End: 2024-05-20 | Stop reason: SDUPTHER

## 2024-02-26 NOTE — PROGRESS NOTES
Subjective    All Individuals Present: Patient and Provider (Encounter Provider)     ID: Cheyenne Wolf is a 34 y.o. female with history of KENJI and PTSD presenting to  IOP.     Interval History/HPI/PFSH:  @29wga. Has repeat US this week.    A lot of fatigue. Still recovering from COVID ~10 days ago. Harder time sleeping at night, BP has remained okay.    Craniofacial surgery has said cleft lip is very small.    Feeling more anxious overall. More separation anxiety with toddler, worry that something bad will happen to him when she is elsewhere. Gets tearful thinking about it.    Using lorazepam ~2/week, still effective. Largely around appointments.      Denies SI, HI, AVH.     Medication side effects: None Reported     Review of Systems  Constitutional: Negative  Psychiatric: Positive for anxiety, fatigue, and phobia , Negative for depression, irritability, mood swings, and sleep disturbance  Neurological: Negative   Other: pregnant @29wga    Objective   LMP 2023 (Exact Date)   Wt Readings from Last 4 Encounters:   24 125 kg (276 lb 3.8 oz)   04/15/24 123 kg (270 lb 5.1 oz)   24 124 kg (272 lb 9.6 oz)   24 121 kg (267 lb 11.2 oz)       Mental Status Exam  General Appearance: Well groomed, appropriate eye contact.  Attitude/Behavior: Cooperative, conversant, engaged, and with good eye contact.  Motor: No psychomotor agitation or retardation, no tremor or other abnormal movements.  Speech: Normal rate, volume, prosody  Gait/Station: Within normal limits  Mood: anxious.  Affect: Constricted, Anxious, and Congruent with mood and topic of conversation  Thought Process: Linear, goal directed  Thought Associations: No loosening of associations  Thought Content: normal  Sensorium: Alert and oriented to person, place, time and situation  Insight: Intact, as evidenced by awareness of symptom patterns, introspection  Judgment: Intact  Cognition: Cognitively intact to conversational testing  with respect to attention, orientation, fund of knowledge, recent and remote memory, and language.  Testing: N/A.    Laboratory/Imaging/Diagnostic Tests       Assessment/Plan   Overall Formulation and Differential Diagnosis:  Cheyenne Wolf is a 34 y.o. female who meets criteria for KENIJ and PTSD.  Interval Assessment:   @29wga presenting to [facility] d/t worsening anxiety and hypervigilance in pregnancy. Had previously been on fluoxetine to fair effect after traumatic birth experience with first child. Was switched to sertraline with current pregnancy by outside provider, has noted worsening anxiety and no purported benefit from such. Will try transitioning back to fluoxetine given prior efficacy. Discussed limited data regarding fluoxetine, and discussed risks, benefits, and alternatives in context of pregnancy and postpartum, and she was agreeable.    Plan:  -discontinue sertraline  -increase fluoxetine to 80 mg next week  -continue lorazepam to 1 mg PO daily PRN  -RTC 4 weeks    Risk Assessment:  Imminent Risk of Suicide or Serious Self-Injury: Low Risk -- Risk factors include: History of trauma or abuse  Protective factors include:Denies current suicidal ideation, Denies history of suicide attempts , Future-oriented talk , Willingness to seek help and support , Skills in problem solving, conflict resolution, and nonviolent handling of disputes, Cultural and Restorationist beliefs that discourage suicide and support self-preservation , Access to a variety of clinical interventions , Receiving and engaged in care for mental, physical, and substance use disorders , History of adhering to treatment recommendations and/or prescribed medication regimen , Support through ongoing medical and mental healthcare relationships , Current/history of good response to treatment/meds , Interpersonal relationships and supports, e.g., family, friends, peers, community , and Restricted access to firearms or other lethal means of suicide    Imminent Risk of Violence or Homicide: Low Risk - Risk factors include: No significant risk factors identified on screening. Protective factors include: Lack of known history of harm to others , Lack of known history of violent ideation , Lack of known access to firearms , Sense of community, availability/access to resources and support , Sense of optimism, hope , Interpersonal competence , Affect regulation , Sense of self-efficacy, internal locus of control , and Positive, pro-social family/peer network   Treatment Plan:  There are no recently modified care plans to display for this patient.      Attestation Statements   Portion Spent on Counseling & Coordination of Care: N/A - E&M coding by complexity of care.

## 2024-02-27 ENCOUNTER — LAB (OUTPATIENT)
Dept: LAB | Facility: LAB | Age: 34
End: 2024-02-27
Payer: COMMERCIAL

## 2024-02-27 DIAGNOSIS — Z3A.24 24 WEEKS GESTATION OF PREGNANCY (HHS-HCC): ICD-10-CM

## 2024-02-27 DIAGNOSIS — O99.810 ABNORMAL GLUCOSE TOLERANCE TEST IN PREGNANCY (HHS-HCC): Primary | ICD-10-CM

## 2024-02-27 LAB
ERYTHROCYTE [DISTWIDTH] IN BLOOD BY AUTOMATED COUNT: 12.1 % (ref 11.5–14.5)
GLUCOSE 1H P 50 G GLC PO SERPL-MCNC: 154 MG/DL
HCT VFR BLD AUTO: 36.1 % (ref 36–46)
HGB BLD-MCNC: 12.1 G/DL (ref 12–16)
MCH RBC QN AUTO: 31.8 PG (ref 26–34)
MCHC RBC AUTO-ENTMCNC: 33.5 G/DL (ref 32–36)
MCV RBC AUTO: 95 FL (ref 80–100)
NRBC BLD-RTO: 0 /100 WBCS (ref 0–0)
PLATELET # BLD AUTO: 320 X10*3/UL (ref 150–450)
RBC # BLD AUTO: 3.8 X10*6/UL (ref 4–5.2)
REFLEX ADDED, ANEMIA PANEL: NORMAL
TREPONEMA PALLIDUM IGG+IGM AB [PRESENCE] IN SERUM OR PLASMA BY IMMUNOASSAY: NONREACTIVE
WBC # BLD AUTO: 10 X10*3/UL (ref 4.4–11.3)

## 2024-02-27 PROCEDURE — 85027 COMPLETE CBC AUTOMATED: CPT

## 2024-02-27 PROCEDURE — 86780 TREPONEMA PALLIDUM: CPT

## 2024-02-27 PROCEDURE — 82947 ASSAY GLUCOSE BLOOD QUANT: CPT

## 2024-02-27 PROCEDURE — 36415 COLL VENOUS BLD VENIPUNCTURE: CPT

## 2024-02-28 ENCOUNTER — SOCIAL WORK (OUTPATIENT)
Dept: BEHAVIORAL HEALTH | Facility: CLINIC | Age: 34
End: 2024-02-28
Payer: COMMERCIAL

## 2024-02-28 ENCOUNTER — TELEPHONE (OUTPATIENT)
Dept: OBSTETRICS AND GYNECOLOGY | Facility: HOSPITAL | Age: 34
End: 2024-02-28
Payer: COMMERCIAL

## 2024-02-28 DIAGNOSIS — F43.10 PTSD (POST-TRAUMATIC STRESS DISORDER): ICD-10-CM

## 2024-02-28 PROCEDURE — 90837 PSYTX W PT 60 MINUTES: CPT | Mod: AJ,95 | Performed by: SOCIAL WORKER

## 2024-02-28 NOTE — TELEPHONE ENCOUNTER
----- Message from Bianca Helms MD sent at 2024  5:43 PM EST -----  Needs 3 hour.  It's not letting me send her a mychart message about it for some reason.  Ordering the study but can you please let her know?    Contacted patient to discuss test results  Patient identified by name and   Informed patient that 1 hour glucose screening desired level is less than 135 and hers is elevated at 154   Explained to patient that the elevated level indicates the need to complete the 3 hour glucose tolerance test  Patient educated that she will need to fast meaning nothing to eat or drink for at least 8 hours prior to the test except water   The test will take approximately 3 hours requiring 4 blood draws and she must stay at the lab during that time.   Patient encouraged to complete testing as soon as possible.   Informed patient that her syphilis screen was negative and her CBC levels were WNL  Patient missed rpn appointment  due to Covid, per the okay from Dr. Helms patient scheduled for rpn 3/11 0830 at [facility]  Patient verbalized understanding and denies any questions or concerns.   Encouraged patient to call office with any questions or concerns  Keerthi Gaytan RN

## 2024-02-29 ENCOUNTER — OFFICE VISIT (OUTPATIENT)
Dept: PLASTIC SURGERY | Facility: HOSPITAL | Age: 34
End: 2024-02-29
Payer: COMMERCIAL

## 2024-02-29 ENCOUNTER — HOSPITAL ENCOUNTER (OUTPATIENT)
Dept: RADIOLOGY | Facility: HOSPITAL | Age: 34
Discharge: HOME | End: 2024-02-29
Payer: COMMERCIAL

## 2024-02-29 DIAGNOSIS — Z71.89 PRENATAL CONSULT: Primary | ICD-10-CM

## 2024-02-29 DIAGNOSIS — O35.9XX0 KNOWN FETAL ANOMALY, ANTEPARTUM, SINGLE OR UNSPECIFIED FETUS (HHS-HCC): ICD-10-CM

## 2024-02-29 PROCEDURE — 1036F TOBACCO NON-USER: CPT | Performed by: NURSE PRACTITIONER

## 2024-02-29 PROCEDURE — 76819 FETAL BIOPHYS PROFIL W/O NST: CPT

## 2024-02-29 PROCEDURE — 76819 FETAL BIOPHYS PROFIL W/O NST: CPT | Performed by: OBSTETRICS & GYNECOLOGY

## 2024-02-29 PROCEDURE — 99212 OFFICE O/P EST SF 10 MIN: CPT | Performed by: NURSE PRACTITIONER

## 2024-02-29 PROCEDURE — 3008F BODY MASS INDEX DOCD: CPT | Performed by: NURSE PRACTITIONER

## 2024-02-29 PROCEDURE — 76816 OB US FOLLOW-UP PER FETUS: CPT

## 2024-02-29 PROCEDURE — 99202 OFFICE O/P NEW SF 15 MIN: CPT | Performed by: NURSE PRACTITIONER

## 2024-02-29 PROCEDURE — 76816 OB US FOLLOW-UP PER FETUS: CPT | Performed by: OBSTETRICS & GYNECOLOGY

## 2024-02-29 ASSESSMENT — ENCOUNTER SYMPTOMS
OCCASIONAL FEELINGS OF UNSTEADINESS: 0
LOSS OF SENSATION IN FEET: 0
DEPRESSION: 0

## 2024-02-29 NOTE — PROGRESS NOTES
Clinic Note    Reason For Consult  Prenatal consult    History Of Present Illness    Kelsey (), Juli (SLP) and I met with Cheyenne for a prenatal consult regarding a fetal diagnosis of isolate incomplete cleft lip. She is a 33 year year-old O1 woman, currently 30 weeks gestation. Estimated date of delivery is the week of . There have been no pregnancy complications. She has not been hospitalized during this pregnancy. She had an ultrasound performed with results noted for fetal cleft lip. She had a repeat ultrasound performed today noting cleft lip. She also has a 2 year old and 1 year old child.     Past Medical History  She has a past medical history of Abnormal fetal ultrasound (2024), Abnormal glucose tolerance test (GTT) (10/02/2023), Chronic hypertension (10/02/2023), Contusion of right lesser toe(s) without damage to nail, initial encounter (2019), Depression, GBS carrier (10/02/2023), H/O pre-eclampsia in prior pregnancy, currently pregnant, third trimester, Hypertension (2014), Other ovarian cyst, unspecified side (10/02/2023), Ovarian cyst (2023), Pain in unspecified toe(s) (2019), Pre-eclampsia affecting pregnancy, antepartum (10/02/2023), and  delivery after induction of labor (2024).    Medications  Current Outpatient Medications on File Prior to Visit   Medication Sig Dispense Refill    acetaminophen (Tylenol) 500 mg tablet Take 2 tablets (1,000 mg) by mouth every 6 hours if needed.      aspirin 81 mg chewable tablet Chew 1 tablet (81 mg) once daily. Chew and swallow      docusate sodium (Colace) 100 mg capsule Take 1 capsule (100 mg) by mouth 2 times a day as needed for constipation.      FLUoxetine (PROzac) 20 mg capsule Take 1 capsule (20 mg) by mouth once daily. 30 capsule 1    FLUoxetine (PROzac) 40 mg capsule Take 2 capsules (80 mg) by mouth once daily. 60 capsule 1    LORazepam (Ativan) 1 mg tablet Take 1-2 tablets PO  daily PRN anxiety 20 tablet 1    magnesium 250 mg tablet Take 1 tablet (250 mg) by mouth once daily at bedtime.      NIFEdipine CC 30 mg 24 hr tablet TAKE 1 TABLET BY MOUTH ONCE DAILY IN THE MORNING. TAKE BEFORE MEALS. DO NOT CRUSH, CHEW, OR SPLIT. 90 tablet 2    NIFEdipine ER (Adalat CC) 60 mg 24 hr tablet Take 1 tablet (60 mg) by mouth once daily in the morning. Take before meals. Do not crush, chew, or split. 30 tablet 3    prenatal comb no.42/folic acid (PRENA1 CHEW ORAL) Take 1 capsule by mouth once daily.      [DISCONTINUED] FLUoxetine (PROzac) 40 mg capsule Take 1 capsule (40 mg) by mouth once daily. 30 capsule 1    [DISCONTINUED] LORazepam (Ativan) 1 mg tablet Take 1-2 tablets PO daily PRN anxiety 20 tablet 1     Current Facility-Administered Medications on File Prior to Visit   Medication Dose Route Frequency Provider Last Rate Last Admin    aspirin EC tablet 162 mg  162 mg oral Once Lakshmi Orozco MD           Surgical History  She has no past surgical history on file.     Social History  She reports that she has never smoked. She has never used smokeless tobacco. She reports that she does not currently use alcohol. She reports that she does not use drugs.    Allergies  Patient has no known allergies.    Review of Systems  Negative other than what is included in the HPI.      Physical Exam  N/a     Relevant Results  US OB follow up transabdominal approach:  The following structures appear abnormal:  Lips: cleft upper lip: left unilateral    Assessment/Plan     During today's visit we discussed anatomy as it relates to the cleft lip and palate, expectations after delivery, feeding techniques/cleft nursers and timing of her first visit with the Plastic Surgeon, Otolaryngology, Pediatric Dentistry and the Cleft and Craniofacial Team. Resources were provided with educational reference material, a cleft timeline and the entire Cleft Multi-disciplinary team contact list. Mom verbalized understanding of  today's information and did not have any further questions regarding clefting at the end of our visit. They were instructed to call or email with any questions or concerns as they arise.       Cleft Bag provided to patient.    I spent 20 minutes in the professional and overall care of this patient.      Aurelio Sood, APRN-CNP

## 2024-03-01 NOTE — PROGRESS NOTES
"This session was conducted via HIPAA compliant video. Pt was provided with informed consent.    INDIVIDUAL PSYCHOTHERAPY    Time: 10:05-11:05am    Mental Status: Alert & Oriented x3    Hx of present illness: Pt endorsing anxiety and worry related to trauma reminders.    Diagnosis/problems: PTSD    Method of therapy: EMDR, IFS-informed    Provider impressions/therapy summary: Pt shared feeling exhausted- difficulty sleeping with pregnancy. Part of her \"wants her life back\" as she anticipates having a second child and another part feels guilt about that. Pt able to identify where the part that feels guilty comes from (messaging from childhood/family.) Pt shared having increased anxiety  from her son lately- we connected this to her traumatic birth experience. Pt began to describe some of the memories surrounding the birth and was tearful. She is interested in reprocessing with EMDR hand we described some of the pros and cons of doing so, especially in pregnancy (potential for relief from symptoms and also temporary increase in anxiety and distress in session.) We agreed to move forward with preparation/resourcing in next session.    Identified goals/objectives: Preparation for EMDR- continued resourcing, introduction to the model, practice affect regulation/self-soothing strategies.    Response to therapy: Engaged    Treatment plan and process: Continue with above stated goals; follow up 1 week.    MANAN Bragg   "

## 2024-03-04 ENCOUNTER — APPOINTMENT (OUTPATIENT)
Dept: RADIOLOGY | Facility: HOSPITAL | Age: 34
End: 2024-03-04
Payer: COMMERCIAL

## 2024-03-04 ENCOUNTER — TELEMEDICINE (OUTPATIENT)
Dept: BEHAVIORAL HEALTH | Facility: CLINIC | Age: 34
End: 2024-03-04
Payer: COMMERCIAL

## 2024-03-04 DIAGNOSIS — F43.10 PTSD (POST-TRAUMATIC STRESS DISORDER): ICD-10-CM

## 2024-03-04 DIAGNOSIS — F41.1 GAD (GENERALIZED ANXIETY DISORDER): ICD-10-CM

## 2024-03-04 PROCEDURE — 99214 OFFICE O/P EST MOD 30 MIN: CPT | Performed by: STUDENT IN AN ORGANIZED HEALTH CARE EDUCATION/TRAINING PROGRAM

## 2024-03-04 PROCEDURE — 3008F BODY MASS INDEX DOCD: CPT | Performed by: STUDENT IN AN ORGANIZED HEALTH CARE EDUCATION/TRAINING PROGRAM

## 2024-03-04 NOTE — PROGRESS NOTES
Subjective    All Individuals Present: Patient and Provider (Encounter Provider)     ID: Cheyenne Wolf is a 34 y.o. female with history of KENJI and PTSD presenting to  IOP.     Interval History/HPI/PFSH:  @30wga.     Nights are now better anxiety-wise. Feels like dose increase has helped feel less stuck on anxious thoughts. Feels like she has still benefitted from IOP because she would have been derailed by news otherwise.    Had US last week, feels like different messages regarding increased vein size, will now have weekly ultrasound. Failed 1 hour GTT, has to do 3 hour this weekend.    Trying to continue walking daily.        Denies SI, HI, AVH.     Medication side effects: None Reported     Review of Systems  Constitutional: Negative  Psychiatric: Positive for anxiety, fatigue, and phobia , Negative for depression, irritability, mood swings, and sleep disturbance  Neurological: Negative   Other: pregnant @30wga    Objective   LMP 2023 (Exact Date)   Wt Readings from Last 4 Encounters:   24 123 kg (271 lb)   23 122 kg (270 lb)   10/25/23 124 kg (274 lb)   10/04/23 124 kg (274 lb)     Mental Status Exam  General Appearance: Well groomed, appropriate eye contact.  Attitude/Behavior: Cooperative, conversant, engaged, and with good eye contact.  Motor: No psychomotor agitation or retardation, no tremor or other abnormal movements.  Speech: Normal rate, volume, prosody  Gait/Station: Within normal limits  Mood: anxious.  Affect: Constricted, Anxious, and Congruent with mood and topic of conversation  Thought Process: Linear, goal directed  Thought Associations: No loosening of associations  Thought Content: normal  Sensorium: Alert and oriented to person, place, time and situation  Insight: Intact, as evidenced by awareness of symptom patterns, introspection  Judgment: Intact  Cognition: Cognitively intact to conversational testing with respect to attention, orientation, fund of knowledge,  recent and remote memory, and language.  Testing: N/A.    Laboratory/Imaging/Diagnostic Tests       Assessment/Plan   Overall Formulation and Differential Diagnosis:  Cheyenne Wolf is a 34 y.o. female who meets criteria for KENJI and PTSD.  Interval Assessment:   @30wga presenting to [facility] d/t worsening anxiety and hypervigilance in pregnancy. Had previously been on fluoxetine to fair effect after traumatic birth experience with first child. Was switched to sertraline with current pregnancy by outside provider, has noted worsening anxiety and no purported benefit from such. Will try transitioning back to fluoxetine given prior efficacy. Discussed limited data regarding fluoxetine, and discussed risks, benefits, and alternatives in context of pregnancy and postpartum, and she was agreeable.    Plan:  -discontinue sertraline  -continue fluoxetine 80 mg next week  -continue lorazepam to 1 mg PO daily PRN  -RTC 2-4 weeks    Risk Assessment:  Imminent Risk of Suicide or Serious Self-Injury: Low Risk -- Risk factors include: History of trauma or abuse  Protective factors include:Denies current suicidal ideation, Denies history of suicide attempts , Future-oriented talk , Willingness to seek help and support , Skills in problem solving, conflict resolution, and nonviolent handling of disputes, Cultural and Orthodoxy beliefs that discourage suicide and support self-preservation , Access to a variety of clinical interventions , Receiving and engaged in care for mental, physical, and substance use disorders , History of adhering to treatment recommendations and/or prescribed medication regimen , Support through ongoing medical and mental healthcare relationships , Current/history of good response to treatment/meds , Interpersonal relationships and supports, e.g., family, friends, peers, community , and Restricted access to firearms or other lethal means of suicide   Imminent Risk of Violence or Homicide: Low Risk - Risk  factors include: No significant risk factors identified on screening. Protective factors include: Lack of known history of harm to others , Lack of known history of violent ideation , Lack of known access to firearms , Sense of community, availability/access to resources and support , Sense of optimism, hope , Interpersonal competence , Affect regulation , Sense of self-efficacy, internal locus of control , and Positive, pro-social family/peer network   Treatment Plan:  There are no recently modified care plans to display for this patient.      Attestation Statements   Portion Spent on Counseling & Coordination of Care: N/A - E&M coding by complexity of care.

## 2024-03-06 ENCOUNTER — APPOINTMENT (OUTPATIENT)
Dept: RADIOLOGY | Facility: HOSPITAL | Age: 34
End: 2024-03-06
Payer: COMMERCIAL

## 2024-03-08 ENCOUNTER — HOSPITAL ENCOUNTER (OUTPATIENT)
Dept: RADIOLOGY | Facility: HOSPITAL | Age: 34
Discharge: HOME | End: 2024-03-08
Payer: COMMERCIAL

## 2024-03-08 ENCOUNTER — APPOINTMENT (OUTPATIENT)
Dept: BEHAVIORAL HEALTH | Facility: CLINIC | Age: 34
End: 2024-03-08
Payer: COMMERCIAL

## 2024-03-08 DIAGNOSIS — O35.9XX0 KNOWN FETAL ANOMALY, ANTEPARTUM, SINGLE OR UNSPECIFIED FETUS (HHS-HCC): ICD-10-CM

## 2024-03-08 PROCEDURE — 76818 FETAL BIOPHYS PROFILE W/NST: CPT

## 2024-03-09 ENCOUNTER — LAB (OUTPATIENT)
Dept: LAB | Facility: LAB | Age: 34
End: 2024-03-09
Payer: COMMERCIAL

## 2024-03-09 DIAGNOSIS — O99.810 ABNORMAL GLUCOSE TOLERANCE TEST IN PREGNANCY (HHS-HCC): ICD-10-CM

## 2024-03-09 LAB
GLUCOSE 1H P 100 G GLC PO SERPL-MCNC: 164 MG/DL
GLUCOSE 2H P 100 G GLC PO SERPL-MCNC: 71 MG/DL
GLUCOSE 3H P 100 G GLC PO SERPL-MCNC: 59 MG/DL
GLUCOSE P FAST SERPL-MCNC: 80 MG/DL

## 2024-03-09 PROCEDURE — 82950 GLUCOSE TEST: CPT

## 2024-03-09 PROCEDURE — 82951 GLUCOSE TOLERANCE TEST (GTT): CPT

## 2024-03-09 PROCEDURE — 82947 ASSAY GLUCOSE BLOOD QUANT: CPT

## 2024-03-09 PROCEDURE — 82952 GTT-ADDED SAMPLES: CPT

## 2024-03-09 PROCEDURE — 36415 COLL VENOUS BLD VENIPUNCTURE: CPT

## 2024-03-11 ENCOUNTER — LAB (OUTPATIENT)
Dept: LAB | Facility: LAB | Age: 34
End: 2024-03-11
Payer: COMMERCIAL

## 2024-03-11 ENCOUNTER — ROUTINE PRENATAL (OUTPATIENT)
Dept: OBSTETRICS AND GYNECOLOGY | Facility: CLINIC | Age: 34
End: 2024-03-11
Payer: COMMERCIAL

## 2024-03-11 VITALS — DIASTOLIC BLOOD PRESSURE: 84 MMHG | WEIGHT: 267.7 LBS | SYSTOLIC BLOOD PRESSURE: 144 MMHG | BODY MASS INDEX: 44.55 KG/M2

## 2024-03-11 DIAGNOSIS — O10.919 CHRONIC HYPERTENSION AFFECTING PREGNANCY (HHS-HCC): Primary | ICD-10-CM

## 2024-03-11 DIAGNOSIS — O99.810 ABNORMAL GLUCOSE TOLERANCE TEST IN PREGNANCY (HHS-HCC): ICD-10-CM

## 2024-03-11 DIAGNOSIS — O10.919 CHRONIC HYPERTENSION AFFECTING PREGNANCY (HHS-HCC): ICD-10-CM

## 2024-03-11 DIAGNOSIS — O35.AXX0 FETAL CLEFT LIP AND PALATE AFFECTING ANTEPARTUM CARE OF MOTHER, SINGLE OR UNSPECIFIED FETUS (HHS-HCC): ICD-10-CM

## 2024-03-11 DIAGNOSIS — Z23 NEED FOR TDAP VACCINATION: ICD-10-CM

## 2024-03-11 DIAGNOSIS — Z3A.31 31 WEEKS GESTATION OF PREGNANCY (HHS-HCC): ICD-10-CM

## 2024-03-11 LAB
ALBUMIN SERPL BCP-MCNC: 3.8 G/DL (ref 3.4–5)
ALP SERPL-CCNC: 69 U/L (ref 33–110)
ALT SERPL W P-5'-P-CCNC: 9 U/L (ref 7–45)
ANION GAP SERPL CALC-SCNC: 13 MMOL/L (ref 10–20)
AST SERPL W P-5'-P-CCNC: 10 U/L (ref 9–39)
BILIRUB SERPL-MCNC: 0.3 MG/DL (ref 0–1.2)
BUN SERPL-MCNC: 4 MG/DL (ref 6–23)
CALCIUM SERPL-MCNC: 9.2 MG/DL (ref 8.6–10.6)
CHLORIDE SERPL-SCNC: 104 MMOL/L (ref 98–107)
CO2 SERPL-SCNC: 25 MMOL/L (ref 21–32)
CREAT SERPL-MCNC: 0.46 MG/DL (ref 0.5–1.05)
CREAT UR-MCNC: 9 MG/DL (ref 20–320)
EGFRCR SERPLBLD CKD-EPI 2021: >90 ML/MIN/1.73M*2
ERYTHROCYTE [DISTWIDTH] IN BLOOD BY AUTOMATED COUNT: 12.4 % (ref 11.5–14.5)
GLUCOSE SERPL-MCNC: 84 MG/DL (ref 74–99)
HCT VFR BLD AUTO: 37.8 % (ref 36–46)
HGB BLD-MCNC: 12.5 G/DL (ref 12–16)
MCH RBC QN AUTO: 31.6 PG (ref 26–34)
MCHC RBC AUTO-ENTMCNC: 33.1 G/DL (ref 32–36)
MCV RBC AUTO: 96 FL (ref 80–100)
NRBC BLD-RTO: 0 /100 WBCS (ref 0–0)
PLATELET # BLD AUTO: 339 X10*3/UL (ref 150–450)
POTASSIUM SERPL-SCNC: 4.2 MMOL/L (ref 3.5–5.3)
PROT SERPL-MCNC: 6.7 G/DL (ref 6.4–8.2)
PROT UR-ACNC: <4 MG/DL (ref 5–24)
PROT/CREAT UR: ABNORMAL MG/G{CREAT}
RBC # BLD AUTO: 3.96 X10*6/UL (ref 4–5.2)
SODIUM SERPL-SCNC: 138 MMOL/L (ref 136–145)
WBC # BLD AUTO: 9.3 X10*3/UL (ref 4.4–11.3)

## 2024-03-11 PROCEDURE — 0501F PRENATAL FLOW SHEET: CPT | Performed by: STUDENT IN AN ORGANIZED HEALTH CARE EDUCATION/TRAINING PROGRAM

## 2024-03-11 PROCEDURE — 82570 ASSAY OF URINE CREATININE: CPT | Performed by: STUDENT IN AN ORGANIZED HEALTH CARE EDUCATION/TRAINING PROGRAM

## 2024-03-11 PROCEDURE — 85027 COMPLETE CBC AUTOMATED: CPT

## 2024-03-11 PROCEDURE — 36415 COLL VENOUS BLD VENIPUNCTURE: CPT

## 2024-03-11 PROCEDURE — 2500000004 HC RX 250 GENERAL PHARMACY W/ HCPCS (ALT 636 FOR OP/ED): Performed by: STUDENT IN AN ORGANIZED HEALTH CARE EDUCATION/TRAINING PROGRAM

## 2024-03-11 PROCEDURE — 90471 IMMUNIZATION ADMIN: CPT | Performed by: STUDENT IN AN ORGANIZED HEALTH CARE EDUCATION/TRAINING PROGRAM

## 2024-03-11 PROCEDURE — 80053 COMPREHEN METABOLIC PANEL: CPT

## 2024-03-11 PROCEDURE — 90715 TDAP VACCINE 7 YRS/> IM: CPT | Performed by: STUDENT IN AN ORGANIZED HEALTH CARE EDUCATION/TRAINING PROGRAM

## 2024-03-11 RX ADMIN — TETANUS TOXOID, REDUCED DIPHTHERIA TOXOID AND ACELLULAR PERTUSSIS VACCINE, ADSORBED 0.5 ML: 5; 2.5; 8; 8; 2.5 SUSPENSION INTRAMUSCULAR at 09:21

## 2024-03-11 NOTE — PROGRESS NOTES
Subjective   Patient ID [id number]   Cheyenne Wolf is a 33 y.o.  at 31w1d with a working estimated date of delivery of 2024, by Last Menstrual Period who presents for a routine prenatal visit. She denies vaginal bleeding, leakage of fluid, decreased fetal movements, or contractions.  She also reports feeling at her usual baseline without any preeclampsia symptoms and notes that, in her last pregnancy at the time of PEC diagnosis she felt very poorly.    Her pregnancy is complicated by:  cHTN  Abnormal 1 hr with normal 3 hour  BMI 44    Objective   Physical Exam:   Weight: 121 kg (267 lb 11.2 oz)  Expected Total Weight Gain: 5 kg (11 lb)-9 kg (19 lb)   Pregravid BMI: 44.93  BP: 144/84 ()  Fetal Heart Rate: 145 Fundal Height (cm): 31 cm                 Assessment/Plan   Problem List Items Addressed This Visit       Chronic hypertension affecting pregnancy - Primary    Overview     - nifedipine 30 initiated at 11 wga with good control since, continue current  -  testing and delivery at 37 wga given mild range BP values on medication and umbilical vein varix  - given mild range BP today, will repeat P:C and HELLP labs as outpatient however given lack of sx and BP at pt baseline with normal home BP values, defer triage eval  - IOL requested 24         Relevant Orders    CBC Anemia Panel With Reflex,Pregnancy    Comprehensive Metabolic Panel    Protein, Urine Random    Cleft lip and palate, fetal, affecting care of mother, antepartum    Overview     Unilateral cleft lip suspected on anatomic survey. Palate unable to be adequately assessed.   [x]  Fetal care clinic   [x]  Follow up completed anatomy  [x]  Referral to cranio-facial surgery for prenatal consultation in third trimester  [x]  Fetal care visit around 30 weeks  - 3/8 US demonstrating normal appearing upper lip and prior studies reviewed with poor image quality noted previously, possibly no cleft present         31 weeks  gestation of pregnancy    Overview     [x] Dating: lmp c/w 11 wk US  [x] Initial BMI: 44  [x] Prenatal Labs: wnl  [x] Genetic Screening:  rr cf DNA  [x] Baby ASA: taking  [x] Anatomy US: wnl with exception of unilateral (L) cleft lip, palate appears unaffected but cannot be ruled out on US  [x] 1hr GCT at 24-28wks: abnormal 1hr (154), nml 3hr   [x] Tdap (27-36wks): 3/11/24  [x] Flu Shot: administered 10/25/23  [x] COVID vaccine: declines for now, counseled on rational for vaccination in pregnancy  [] GBS at 36 wks:  [x] Breastfeeding: yes  [x] Postpartum Birth control method: intends condoms, has been her preferred method  [x] Mode/timing of delivery: IOL 37-38 wga for cHTN on meds           Relevant Orders    CBC Anemia Panel With Reflex,Pregnancy    Comprehensive Metabolic Panel    Protein, Urine Random    Abnormal glucose tolerance test in pregnancy    Overview     - 1 hr 154, 3 hr wnl          Other Visit Diagnoses       Need for Tdap vaccination        Relevant Medications    diphth,pertus(acell),tetanus (BoostRIX) 2.5-8-5 Lf-mcg-Lf/0.5mL vaccine 0.5 mL (Completed) (Start on 3/11/2024  9:30 AM)            Continue prenatal vitamin.  Labs reviewed.  GBS taken.  Expected mode of delivery SVB  Follow up in 2 week for a routine prenatal visit.

## 2024-03-11 NOTE — PATIENT INSTRUCTIONS
Good to see you again today!  Here's a few doulas you may wish to check out:    Bianca Cortés  [email or web address]    Alicia Christianson   [email or web address]    Nargis Dorantes  [email or web address]    [facility] (multiple birth [email or web address] postpartum doulas):  [email or web address]    Deysi Gardner  [email or web address]    Hope you find a good Match!

## 2024-03-12 ENCOUNTER — APPOINTMENT (OUTPATIENT)
Dept: RADIOLOGY | Facility: HOSPITAL | Age: 34
End: 2024-03-12
Payer: COMMERCIAL

## 2024-03-12 ENCOUNTER — TELEPHONE (OUTPATIENT)
Dept: OBSTETRICS AND GYNECOLOGY | Facility: HOSPITAL | Age: 34
End: 2024-03-12
Payer: COMMERCIAL

## 2024-03-12 LAB — REFLEX ADDED, ANEMIA PANEL: NORMAL

## 2024-03-12 NOTE — TELEPHONE ENCOUNTER
Contacted Cheyenne to discuss her concerns regarding lactation. Patient identified by name and . Discussed that it is safe to collect milk that is leaking from breast into Haakaa. Encouraged patient to refrain from pumping milk at this time in pregnancy to prevent increased uterine stimulation, as it may cause contractions. The patient verbalized understanding. She denies any pregnancy complications, denies vaginal bleeding, leakage of fluid, or uterine cramping. All questions and concerns were addressed.    Nusrat NGUYEN, RN, [facility]     ----- Message from Cheyenne Wolf sent at 3/12/2024 10:02 AM EDT -----  Regarding: Lactation  Contact: [phone]  Good morning,     This morning I started lactating a little bit. Is it okay is I use my Haakaa's to collect the milk?    My milk didn't come in with Doug until after this breath, so I figured I would ask.     Thank you!    Cheyenne

## 2024-03-13 ENCOUNTER — SOCIAL WORK (OUTPATIENT)
Dept: BEHAVIORAL HEALTH | Facility: CLINIC | Age: 34
End: 2024-03-13
Payer: COMMERCIAL

## 2024-03-13 DIAGNOSIS — F43.10 PTSD (POST-TRAUMATIC STRESS DISORDER): ICD-10-CM

## 2024-03-13 PROCEDURE — 90837 PSYTX W PT 60 MINUTES: CPT | Mod: AJ,95 | Performed by: SOCIAL WORKER

## 2024-03-13 NOTE — PROGRESS NOTES
This session was conducted via HIPAA compliant video. Pt was provided with informed consent.    INDIVIDUAL PSYCHOTHERAPY    Time: 10:05-11:02a    Mental Status: Alert & Oriented x3    Hx of present illness: Pt endorses some increase in emotional lability in the context of pregnancy and family stressors.     Diagnosis/problems: PTSD, anxiety    Method of therapy: Supportive, trauma-informed    Provider impressions/therapy summary: Shifted from preparation for EMDR reprocessing/desensitization to supportive therapy, given that Pt is scheduled now for induction of labor on 4/24/24. Pt shared about stressors related to pregnancy and conflict in communication with family members. Discussed setting boundaries and effective communication of her needs and wishes at this time. She endorses a significant reduction in anxiety and much improved functioning since starting mental health treatment/at the start of pregnancy and reflected on her strengths. Discussed with pt anchors she can come back to throughout the ups and downs of the remainder of pregnancy and birth- relationship with , relationships with care team, inner wisdom and strengths including remembering how she has coped effectively with the challenges this pregnancy has brought so far.    Identified goals/objectives: Continue with supportive therapy- helping pt connect with strengths, resources prior to birth. Encourage pt use of interpersonal effectiveness skills, setting boundaries/communicating needs with providers and others in her personal life.    Response to therapy: Engaged    Treatment plan and process: Continue with above stated goals; follow up 1 week.    MANAN Bragg

## 2024-03-15 ENCOUNTER — HOSPITAL ENCOUNTER (OUTPATIENT)
Dept: RADIOLOGY | Facility: HOSPITAL | Age: 34
Discharge: HOME | End: 2024-03-15
Payer: COMMERCIAL

## 2024-03-15 ENCOUNTER — APPOINTMENT (OUTPATIENT)
Dept: BEHAVIORAL HEALTH | Facility: CLINIC | Age: 34
End: 2024-03-15
Payer: COMMERCIAL

## 2024-03-15 DIAGNOSIS — O35.9XX0 KNOWN FETAL ANOMALY, ANTEPARTUM, SINGLE OR UNSPECIFIED FETUS (HHS-HCC): ICD-10-CM

## 2024-03-15 PROCEDURE — 76819 FETAL BIOPHYS PROFIL W/O NST: CPT | Performed by: STUDENT IN AN ORGANIZED HEALTH CARE EDUCATION/TRAINING PROGRAM

## 2024-03-15 PROCEDURE — 76819 FETAL BIOPHYS PROFIL W/O NST: CPT

## 2024-03-18 ENCOUNTER — TELEMEDICINE (OUTPATIENT)
Dept: BEHAVIORAL HEALTH | Facility: CLINIC | Age: 34
End: 2024-03-18
Payer: COMMERCIAL

## 2024-03-18 DIAGNOSIS — F41.1 GAD (GENERALIZED ANXIETY DISORDER): ICD-10-CM

## 2024-03-18 DIAGNOSIS — F43.10 PTSD (POST-TRAUMATIC STRESS DISORDER): ICD-10-CM

## 2024-03-18 PROCEDURE — 3008F BODY MASS INDEX DOCD: CPT | Performed by: STUDENT IN AN ORGANIZED HEALTH CARE EDUCATION/TRAINING PROGRAM

## 2024-03-18 PROCEDURE — 99214 OFFICE O/P EST MOD 30 MIN: CPT | Performed by: STUDENT IN AN ORGANIZED HEALTH CARE EDUCATION/TRAINING PROGRAM

## 2024-03-18 RX ORDER — TRAZODONE HYDROCHLORIDE 50 MG/1
50-100 TABLET ORAL NIGHTLY
Qty: 60 TABLET | Refills: 1 | Status: SHIPPED | OUTPATIENT
Start: 2024-03-18 | End: 2024-05-20 | Stop reason: SDUPTHER

## 2024-03-18 NOTE — PROGRESS NOTES
"Subjective    All Individuals Present: Patient and Provider (Encounter Provider)     ID: Cheyenne Wolf is a 34 y.o. female with history of KENJI and PTSD presenting to  IOP.     Interval History/HPI/PFSH:  @32wga (CORRINA 24)    Reduced concern about cleft lip/palate, frustrated that she went through so much stress around this.    Has IOL scheduled 24. Passed GTT. Hired  Alicia Meghan through [employer].    Worsening insomnia, will fall asleep easier but waking up frequently with some restless legs.    Has used lorazepam sparingly since fluoxetine increase.      Denies SI, HI, AVH.     Medication side effects: None Reported     Review of Systems  Constitutional: Negative  Psychiatric: Positive for anxiety, fatigue, phobia , and sleep disturbance, Negative for depression, irritability, and mood swings  Neurological: Negative   Other: pregnant @32wga; restless legs at night    Objective   LMP 2023 (Exact Date)   Wt Readings from Last 4 Encounters:   24 121 kg (267 lb 11.2 oz)   24 123 kg (271 lb)   23 122 kg (270 lb)   10/25/23 124 kg (274 lb)     Mental Status Exam  General Appearance: Well groomed, appropriate eye contact.  Attitude/Behavior: Cooperative, conversant, engaged, and with good eye contact.  Motor: No psychomotor agitation or retardation, no tremor or other abnormal movements.  Speech: Normal rate, volume, prosody  Gait/Station: Within normal limits  Mood: anxious, \"frustrated\"  Affect: Constricted, Anxious, and Congruent with mood and topic of conversation  Thought Process: Linear, goal directed  Thought Associations: No loosening of associations  Thought Content: normal  Sensorium: Alert and oriented to person, place, time and situation  Insight: Intact, as evidenced by awareness of symptom patterns, introspection  Judgment: Intact  Cognition: Cognitively intact to conversational testing with respect to attention, orientation, fund of " knowledge, recent and remote memory, and language.  Testing: N/A.    Laboratory/Imaging/Diagnostic Tests       Assessment/Plan   Overall Formulation and Differential Diagnosis:  Cheyenne Wolf is a 34 y.o. female who meets criteria for KENJI and PTSD.  Interval Assessment:   @32wga presenting to [facility] d/t worsening anxiety and hypervigilance in pregnancy. Had previously been on fluoxetine to fair effect after traumatic birth experience with first child. Was switched to sertraline with current pregnancy by outside provider, has noted worsening anxiety and no purported benefit from such. Will try transitioning back to fluoxetine given prior efficacy. Discussed limited data regarding fluoxetine, and discussed risks, benefits, and alternatives in context of pregnancy and postpartum, and she was agreeable.    Plan:  -continue fluoxetine 80 mg next week  -continue lorazepam to 1 mg PO daily PRN  -trial trazodone  mg PO at bedtime PRN insomnia  -RTC 2-4 weeks    Risk Assessment:  Imminent Risk of Suicide or Serious Self-Injury: Low Risk -- Risk factors include: History of trauma or abuse  Protective factors include:Denies current suicidal ideation, Denies history of suicide attempts , Future-oriented talk , Willingness to seek help and support , Skills in problem solving, conflict resolution, and nonviolent handling of disputes, Cultural and Lutheran beliefs that discourage suicide and support self-preservation , Access to a variety of clinical interventions , Receiving and engaged in care for mental, physical, and substance use disorders , History of adhering to treatment recommendations and/or prescribed medication regimen , Support through ongoing medical and mental healthcare relationships , Current/history of good response to treatment/meds , Interpersonal relationships and supports, e.g., family, friends, peers, community , and Restricted access to firearms or other lethal means of suicide   Imminent Risk of  Violence or Homicide: Low Risk - Risk factors include: No significant risk factors identified on screening. Protective factors include: Lack of known history of harm to others , Lack of known history of violent ideation , Lack of known access to firearms , Sense of community, availability/access to resources and support , Sense of optimism, hope , Interpersonal competence , Affect regulation , Sense of self-efficacy, internal locus of control , and Positive, pro-social family/peer network   Treatment Plan:  There are no recently modified care plans to display for this patient.      Attestation Statements   Portion Spent on Counseling & Coordination of Care: N/A - E&M coding by complexity of care.

## 2024-03-19 ENCOUNTER — APPOINTMENT (OUTPATIENT)
Dept: RADIOLOGY | Facility: HOSPITAL | Age: 34
End: 2024-03-19
Payer: COMMERCIAL

## 2024-03-20 ENCOUNTER — SOCIAL WORK (OUTPATIENT)
Dept: BEHAVIORAL HEALTH | Facility: CLINIC | Age: 34
End: 2024-03-20
Payer: COMMERCIAL

## 2024-03-20 DIAGNOSIS — F43.10 PTSD (POST-TRAUMATIC STRESS DISORDER): ICD-10-CM

## 2024-03-20 PROCEDURE — 90837 PSYTX W PT 60 MINUTES: CPT | Mod: AJ,95 | Performed by: SOCIAL WORKER

## 2024-03-20 NOTE — PROGRESS NOTES
"This session was conducted via HIPAA compliant video. Pt was provided with informed consent.    INDIVIDUAL PSYCHOTHERAPY    Time: 12:04-1:00pm    Mental Status: Alert & Oriented x3    Hx of present illness: Pt endorses anxiety related to upcoming birth and maladaptive core beliefs related to childhood trauma.    Diagnosis/problems: PTSD    Method of therapy: EMDR    Provider impressions/therapy summary: Pt overall stable. She endorses some nervousness and anxiety related to upcoming delivery. However, she has hired a  and is coping ahead with some of the potential related stressors. Pt used the session to reflect on the origins of negative core beliefs such as \"I'm dramatic\" and \"I'm not good enough\"- provider led pt in further exploration of family history and family dynamics to identify ways certain beliefs were passed down intergenerationally/relationally. Identified potential future EMDR targets related to childhood traumas. Supported pt in considering coping plan for after session and use of containment/distancing techniques. Also helped pt identify and resource present day strengths (empathy, open-mindedness).     Identified goals/objectives: Continue with resourcing- increase pt connection to positive qualities and inner resources, increase use of healthy coping strategies.     Response to therapy: Engaged    Treatment plan and process: Continue with above stated goals; follow up 1 week.    MANAN Bragg   "

## 2024-03-21 ENCOUNTER — HOSPITAL ENCOUNTER (OUTPATIENT)
Dept: RADIOLOGY | Facility: HOSPITAL | Age: 34
Discharge: HOME | End: 2024-03-21
Payer: COMMERCIAL

## 2024-03-21 DIAGNOSIS — O35.9XX0 KNOWN FETAL ANOMALY, ANTEPARTUM, SINGLE OR UNSPECIFIED FETUS (HHS-HCC): ICD-10-CM

## 2024-03-21 PROCEDURE — 76816 OB US FOLLOW-UP PER FETUS: CPT

## 2024-03-21 PROCEDURE — 76819 FETAL BIOPHYS PROFIL W/O NST: CPT

## 2024-03-21 PROCEDURE — 76819 FETAL BIOPHYS PROFIL W/O NST: CPT | Performed by: MEDICAL GENETICS

## 2024-03-21 PROCEDURE — 76816 OB US FOLLOW-UP PER FETUS: CPT | Performed by: MEDICAL GENETICS

## 2024-03-21 PROCEDURE — 76820 UMBILICAL ARTERY ECHO: CPT | Performed by: MEDICAL GENETICS

## 2024-03-21 PROCEDURE — 76820 UMBILICAL ARTERY ECHO: CPT

## 2024-03-25 ENCOUNTER — TELEMEDICINE (OUTPATIENT)
Dept: BEHAVIORAL HEALTH | Facility: CLINIC | Age: 34
End: 2024-03-25
Payer: COMMERCIAL

## 2024-03-25 DIAGNOSIS — F41.1 GAD (GENERALIZED ANXIETY DISORDER): ICD-10-CM

## 2024-03-25 DIAGNOSIS — F43.10 PTSD (POST-TRAUMATIC STRESS DISORDER): ICD-10-CM

## 2024-03-25 PROBLEM — O36.5990 PREGNANCY AFFECTED BY FETAL GROWTH RESTRICTION (HHS-HCC): Status: ACTIVE | Noted: 2024-03-25

## 2024-03-25 PROCEDURE — 3008F BODY MASS INDEX DOCD: CPT | Performed by: STUDENT IN AN ORGANIZED HEALTH CARE EDUCATION/TRAINING PROGRAM

## 2024-03-25 PROCEDURE — 99214 OFFICE O/P EST MOD 30 MIN: CPT | Performed by: STUDENT IN AN ORGANIZED HEALTH CARE EDUCATION/TRAINING PROGRAM

## 2024-03-25 NOTE — PROGRESS NOTES
"Subjective    All Individuals Present: Patient and Provider (Encounter Provider)     ID: Cheyenne Wolf is a 34 y.o. female with history of KENJI and PTSD presenting to  IOP.     Interval History/HPI/PFSH:  @33wga (CORRINA 24)    Last US was suggestive of fetal growth restriction. Feels burnt out with these close monitoring visits, especially because nothing seems to change.    Trazodone working well, sleeping restfully.    Denies SI, HI, AVH.     Medication side effects: None Reported     Review of Systems  Constitutional: Negative  Psychiatric: Positive for anxiety, fatigue, and phobia , Negative for depression, irritability, mood swings, and sleep disturbance  Neurological: Negative   Other: pregnant @33wga    Objective   LMP 2023 (Exact Date)   Wt Readings from Last 4 Encounters:   24 121 kg (267 lb 11.2 oz)   24 123 kg (271 lb)   23 122 kg (270 lb)   10/25/23 124 kg (274 lb)     Mental Status Exam  General Appearance: Well groomed, appropriate eye contact.  Attitude/Behavior: Cooperative, conversant, engaged, and with good eye contact.  Motor: No psychomotor agitation or retardation, no tremor or other abnormal movements.  Speech: Normal rate, volume, prosody  Gait/Station: Within normal limits  Mood: \"burnt out\"  Affect: Constricted, Anxious, and Congruent with mood and topic of conversation  Thought Process: Linear, goal directed  Thought Associations: No loosening of associations  Thought Content: normal  Sensorium: Alert and oriented to person, place, time and situation  Insight: Intact, as evidenced by awareness of symptom patterns, introspection  Judgment: Intact  Cognition: Cognitively intact to conversational testing with respect to attention, orientation, fund of knowledge, recent and remote memory, and language.  Testing: N/A.    Laboratory/Imaging/Diagnostic Tests       Assessment/Plan   Overall Formulation and Differential Diagnosis:  Cheyenne Wolf is a 34 y.o. " female who meets criteria for KENJI and PTSD.  Interval Assessment:   @33wga presenting to IOP d/t worsening anxiety and hypervigilance in pregnancy. Had previously been on fluoxetine to fair effect after traumatic birth experience with first child. Was switched to sertraline with current pregnancy by outside provider, has noted worsening anxiety and no purported benefit from such. Will try transitioning back to fluoxetine given prior efficacy. Discussed limited data regarding fluoxetine, and discussed risks, benefits, and alternatives in context of pregnancy and postpartum, and she was agreeable.    Plan:  -continue fluoxetine 80 mg next week  -continue lorazepam to 1 mg PO daily PRN  -continue trazodone  mg PO at bedtime PRN insomnia  -RTC 2-4 weeks    Risk Assessment:  Imminent Risk of Suicide or Serious Self-Injury: Low Risk -- Risk factors include: History of trauma or abuse  Protective factors include:Denies current suicidal ideation, Denies history of suicide attempts , Future-oriented talk , Willingness to seek help and support , Skills in problem solving, conflict resolution, and nonviolent handling of disputes, Cultural and Mu-ism beliefs that discourage suicide and support self-preservation , Access to a variety of clinical interventions , Receiving and engaged in care for mental, physical, and substance use disorders , History of adhering to treatment recommendations and/or prescribed medication regimen , Support through ongoing medical and mental healthcare relationships , Current/history of good response to treatment/meds , Interpersonal relationships and supports, e.g., family, friends, peers, community , and Restricted access to firearms or other lethal means of suicide   Imminent Risk of Violence or Homicide: Low Risk - Risk factors include: No significant risk factors identified on screening. Protective factors include: Lack of known history of harm to others , Lack of known history of  violent ideation , Lack of known access to firearms , Sense of community, availability/access to resources and support , Sense of optimism, hope , Interpersonal competence , Affect regulation , Sense of self-efficacy, internal locus of control , and Positive, pro-social family/peer network   Treatment Plan:  There are no recently modified care plans to display for this patient.      Attestation Statements   Portion Spent on Counseling & Coordination of Care: N/A - E&M coding by complexity of care.

## 2024-03-26 ENCOUNTER — APPOINTMENT (OUTPATIENT)
Dept: RADIOLOGY | Facility: HOSPITAL | Age: 34
End: 2024-03-26
Payer: COMMERCIAL

## 2024-03-27 ENCOUNTER — APPOINTMENT (OUTPATIENT)
Dept: BEHAVIORAL HEALTH | Facility: CLINIC | Age: 34
End: 2024-03-27
Payer: COMMERCIAL

## 2024-04-01 ENCOUNTER — APPOINTMENT (OUTPATIENT)
Dept: BEHAVIORAL HEALTH | Facility: CLINIC | Age: 34
End: 2024-04-01
Payer: COMMERCIAL

## 2024-04-02 ENCOUNTER — TELEPHONE (OUTPATIENT)
Dept: OBSTETRICS AND GYNECOLOGY | Facility: HOSPITAL | Age: 34
End: 2024-04-02
Payer: COMMERCIAL

## 2024-04-02 ENCOUNTER — APPOINTMENT (OUTPATIENT)
Dept: OBSTETRICS AND GYNECOLOGY | Facility: HOSPITAL | Age: 34
End: 2024-04-02
Payer: COMMERCIAL

## 2024-04-02 ENCOUNTER — APPOINTMENT (OUTPATIENT)
Dept: RADIOLOGY | Facility: HOSPITAL | Age: 34
End: 2024-04-02
Payer: COMMERCIAL

## 2024-04-02 NOTE — TELEPHONE ENCOUNTER
Patient contacted provider through "" to request appointment  Patient had to cancel appointment today for rpn and BPP due to  issues  Patient is unable to make it to the office the remainder of this week as she is going out of town, provider notified  Patient scheduled for rpn and BPP 4/8 8:30am and 8:45am at [place]  Encouraged patient to call office with any questions or concerns  Keerthi Gaytan RN

## 2024-04-03 ENCOUNTER — APPOINTMENT (OUTPATIENT)
Dept: BEHAVIORAL HEALTH | Facility: CLINIC | Age: 34
End: 2024-04-03
Payer: COMMERCIAL

## 2024-04-08 ENCOUNTER — ROUTINE PRENATAL (OUTPATIENT)
Dept: OBSTETRICS AND GYNECOLOGY | Facility: CLINIC | Age: 34
End: 2024-04-08
Payer: COMMERCIAL

## 2024-04-08 ENCOUNTER — HOSPITAL ENCOUNTER (OUTPATIENT)
Dept: RADIOLOGY | Facility: CLINIC | Age: 34
Discharge: HOME | End: 2024-04-08
Payer: COMMERCIAL

## 2024-04-08 VITALS — BODY MASS INDEX: 45.36 KG/M2 | WEIGHT: 272.6 LBS | DIASTOLIC BLOOD PRESSURE: 69 MMHG | SYSTOLIC BLOOD PRESSURE: 139 MMHG

## 2024-04-08 DIAGNOSIS — O10.919 CHRONIC HYPERTENSION AFFECTING PREGNANCY (HHS-HCC): ICD-10-CM

## 2024-04-08 DIAGNOSIS — Z3A.35 35 WEEKS GESTATION OF PREGNANCY (HHS-HCC): ICD-10-CM

## 2024-04-08 DIAGNOSIS — O36.5990 PREGNANCY AFFECTED BY FETAL GROWTH RESTRICTION (HHS-HCC): Primary | ICD-10-CM

## 2024-04-08 DIAGNOSIS — O35.9XX0 KNOWN FETAL ANOMALY, ANTEPARTUM, SINGLE OR UNSPECIFIED FETUS (HHS-HCC): ICD-10-CM

## 2024-04-08 DIAGNOSIS — O99.213 OBESITY COMPLICATING PREGNANCY, THIRD TRIMESTER (HHS-HCC): ICD-10-CM

## 2024-04-08 PROCEDURE — 76820 UMBILICAL ARTERY ECHO: CPT | Performed by: OBSTETRICS & GYNECOLOGY

## 2024-04-08 PROCEDURE — 76819 FETAL BIOPHYS PROFIL W/O NST: CPT | Performed by: OBSTETRICS & GYNECOLOGY

## 2024-04-08 PROCEDURE — 0501F PRENATAL FLOW SHEET: CPT | Performed by: STUDENT IN AN ORGANIZED HEALTH CARE EDUCATION/TRAINING PROGRAM

## 2024-04-08 PROCEDURE — 76820 UMBILICAL ARTERY ECHO: CPT

## 2024-04-08 PROCEDURE — 76819 FETAL BIOPHYS PROFIL W/O NST: CPT

## 2024-04-08 PROCEDURE — 87081 CULTURE SCREEN ONLY: CPT | Performed by: STUDENT IN AN ORGANIZED HEALTH CARE EDUCATION/TRAINING PROGRAM

## 2024-04-08 RX ORDER — NIFEDIPINE 60 MG/1
60 TABLET, FILM COATED, EXTENDED RELEASE ORAL
Qty: 30 TABLET | Refills: 11 | Status: SHIPPED | OUTPATIENT
Start: 2024-04-08 | End: 2025-04-08

## 2024-04-08 NOTE — PROGRESS NOTES
Subjective   Patient ID [id number]   Cheyenne Wolf is a 34 y.o.  at 35w1d with a working estimated date of delivery of 2024, by Last Menstrual Period who presents for a routine prenatal visit. She denies vaginal bleeding, leakage of fluid, decreased fetal movements, or contractions.    Her pregnancy is complicated by:  -FGR  -cHTN  -sPEC in G1 at 36 wga  -pregravid BMI 44    Objective   Physical Exam:   Weight: 124 kg (272 lb 9.6 oz)  Expected Total Weight Gain: 5 kg (11 lb)-9 kg (19 lb)   Pregravid BMI: 44.93  BP: 139/69 ()  Fetal Heart Rate: US   Presentation: Vertex  Dilation: 1 Effacement (%): 50 Fetal Station: -3, soft, posterior        Assessment/Plan   Problem List Items Addressed This Visit       Chronic hypertension affecting pregnancy    Overview     - nifedipine 30 initiated at 11 wga with good control since, continue current  -  testing and delivery at 37 wga given mild range BP values on medication and umbilical vein varix  - given mild range BP today, will repeat P:C and HELLP labs as outpatient however given lack of sx and BP at pt baseline with normal home BP values, defer triage eval  - IOL requested 24         Relevant Medications    NIFEdipine ER (Adalat CC) 60 mg 24 hr tablet    35 weeks gestation of pregnancy    Overview     [x] Dating: lmp c/w 11 wk US  [x] Initial BMI: 44  [x] Prenatal Labs: wnl  [x] Genetic Screening:  rr cf DNA  [x] Baby ASA: taking  [x] Anatomy US: wnl with exception of unilateral (L) cleft lip, palate appears unaffected but cannot be ruled out on US  [x] 1hr GCT at 24-28wks: abnormal 1hr (154), nml 3hr   [x] Tdap (27-36wks): 3/11/24  [x] Flu Shot: administered 10/25/23  [x] COVID vaccine: declines for now, counseled on rational for vaccination in pregnancy  [x] GBS at 36 wks: collected today, , given planned 37 wk delivery  [x] Breastfeeding: yes  [x] Postpartum Birth control method: intends condoms, has been her preferred method  [x]  Mode/timing of delivery: IOL  at 37.3 wga for cHTN on meds and FGR           Relevant Orders    Group B Streptococcus (GBS) Prenatal Screen, Culture    Pregnancy affected by fetal growth restriction - Primary    Overview     - based on AC 8th%ile, overall EFW 1761g/13th%ile, on 3/21 (32 wga)  - for delivery 37w0d - 39w6d, favor earlier in time frame due to cHTN requiring medication and presence of umbilical vein varix  - weekly  testing on going        Continue prenatal vitamin.  GBS taken.  Expected mode of delivery SVB, with IOL scheduled   Follow up in 1 week for a routine prenatal visit and continue weekly BPP with dopplers for FGR and umbilical vein varix.    Bianca Helms MD

## 2024-04-09 ASSESSMENT — ENCOUNTER SYMPTOMS: HYPERTENSION: 1

## 2024-04-10 ENCOUNTER — SOCIAL WORK (OUTPATIENT)
Dept: BEHAVIORAL HEALTH | Facility: CLINIC | Age: 34
End: 2024-04-10
Payer: COMMERCIAL

## 2024-04-10 DIAGNOSIS — F43.10 PTSD (POST-TRAUMATIC STRESS DISORDER): ICD-10-CM

## 2024-04-10 PROBLEM — O99.820 GBS (GROUP B STREPTOCOCCUS CARRIER), +RV CULTURE, CURRENTLY PREGNANT (HHS-HCC): Status: ACTIVE | Noted: 2023-10-02

## 2024-04-10 LAB — GP B STREP GENITAL QL CULT: ABNORMAL

## 2024-04-10 PROCEDURE — 90837 PSYTX W PT 60 MINUTES: CPT | Mod: AJ,95 | Performed by: SOCIAL WORKER

## 2024-04-11 NOTE — PROGRESS NOTES
This session was conducted via HIPAA compliant video. Pt was provided with informed consent.    INDIVIDUAL PSYCHOTHERAPY    Time: 12:04-1:00pm    Mental Status: Alert & Oriented x3    Diagnosis/problems: PTSD    Method of therapy: Trauma-informed, imaginal exposure, supportive    Patient Location: Pt joined the session virtually from her home    Symptoms: (historically) Flashbacks, panic attacks in response to trauma reminders. Pt endorses mild anxiety presently in the context of preparing for upcoming birth.    Functional status: Pt functioning well currently.    Treatment plan: Due to remittance of many of pt symptoms (pt endorsing significant decrease in anxiety and panic, decrease in flashbacks, increase in positive emotions such as gratitude, hope), pt and provider agreed not to schedule a session until after her delivery. Will re-assess pt well-being postpartum and monitor for any resurgence in symptoms, help pt maintain treatment gains.    Treatment modality/frequency: Trauma-informed therapy, weekly (in the context of upcoming delivery of baby and pt stability, agreed to meet in ~1 month)    Progress since last encounter: Significant     MANAN Bragg

## 2024-04-15 ENCOUNTER — HOSPITAL ENCOUNTER (OUTPATIENT)
Dept: RADIOLOGY | Facility: CLINIC | Age: 34
Discharge: HOME | End: 2024-04-15
Payer: COMMERCIAL

## 2024-04-15 ENCOUNTER — ROUTINE PRENATAL (OUTPATIENT)
Dept: OBSTETRICS AND GYNECOLOGY | Facility: CLINIC | Age: 34
End: 2024-04-15
Payer: COMMERCIAL

## 2024-04-15 ENCOUNTER — APPOINTMENT (OUTPATIENT)
Dept: RADIOLOGY | Facility: CLINIC | Age: 34
End: 2024-04-15
Payer: COMMERCIAL

## 2024-04-15 VITALS
BODY MASS INDEX: 44.98 KG/M2 | DIASTOLIC BLOOD PRESSURE: 82 MMHG | WEIGHT: 270.32 LBS | SYSTOLIC BLOOD PRESSURE: 138 MMHG

## 2024-04-15 DIAGNOSIS — Z3A.36 36 WEEKS GESTATION OF PREGNANCY (HHS-HCC): ICD-10-CM

## 2024-04-15 DIAGNOSIS — O35.9XX0 KNOWN FETAL ANOMALY, ANTEPARTUM, SINGLE OR UNSPECIFIED FETUS (HHS-HCC): ICD-10-CM

## 2024-04-15 DIAGNOSIS — O10.919 CHRONIC HYPERTENSION AFFECTING PREGNANCY (HHS-HCC): Primary | ICD-10-CM

## 2024-04-15 DIAGNOSIS — O99.820 GBS (GROUP B STREPTOCOCCUS CARRIER), +RV CULTURE, CURRENTLY PREGNANT (HHS-HCC): ICD-10-CM

## 2024-04-15 DIAGNOSIS — O99.213 OBESITY COMPLICATING PREGNANCY, THIRD TRIMESTER (HHS-HCC): ICD-10-CM

## 2024-04-15 DIAGNOSIS — O36.5990 PREGNANCY AFFECTED BY FETAL GROWTH RESTRICTION (HHS-HCC): ICD-10-CM

## 2024-04-15 PROCEDURE — 76816 OB US FOLLOW-UP PER FETUS: CPT

## 2024-04-15 PROCEDURE — 76819 FETAL BIOPHYS PROFIL W/O NST: CPT

## 2024-04-15 PROCEDURE — 59425 ANTEPARTUM CARE ONLY: CPT | Performed by: STUDENT IN AN ORGANIZED HEALTH CARE EDUCATION/TRAINING PROGRAM

## 2024-04-15 ASSESSMENT — ENCOUNTER SYMPTOMS
CARDIOVASCULAR NEGATIVE: 0
ENDOCRINE NEGATIVE: 0
GASTROINTESTINAL NEGATIVE: 0
PSYCHIATRIC NEGATIVE: 0
HEMATOLOGIC/LYMPHATIC NEGATIVE: 0
RESPIRATORY NEGATIVE: 0
MUSCULOSKELETAL NEGATIVE: 0
NEUROLOGICAL NEGATIVE: 0
EYES NEGATIVE: 0
ALLERGIC/IMMUNOLOGIC NEGATIVE: 0
CONSTITUTIONAL NEGATIVE: 0

## 2024-04-15 NOTE — PROGRESS NOTES
Subjective   Patient ID [id number]   Cheyenne Wolf is a 34 y.o.  at 36w1d with a working estimated date of delivery of 2024, by Last Menstrual Period who presents for a routine prenatal visit. She denies vaginal bleeding, leakage of fluid, decreased fetal movements, or contractions.    Her pregnancy is complicated by:  - cHTN  - hx sPEC  - FGR  - possible fetal cleft lip, dx at anatomy scan but not seen on third trimester ultrasounds  - elevated 1 hr GTT with normal 3 hour  - GBS positive    Objective   Physical Exam:   Weight: 123 kg (270 lb 5.1 oz)  Expected Total Weight Gain: 5 kg (11 lb)-9 kg (19 lb)   Pregravid BMI: 44.93  BP: 138/82 (initially 156/86, repeat normalized)  Fetal Heart Rate: 151                 Assessment/Plan   Chronic hypertension affecting pregnancy ([facility])  - initial mild range BP today with normalization on recheck, asymptomatic, home BP values wnl.  Defer further eval/intervention at this time.  She will maintain low threshold to present to L&D for symptoms or if home BP values become elevated    GBS (group B Streptococcus carrier), +RV culture, currently pregnant ([facility])  - aware, for PCN ppx    36 weeks gestation of pregnancy ([facility])  - care complete and up-to-date    Pregnancy affected by fetal growth restriction ([facility])  - BPP with Doppler today  - IOL next week  Continue prenatal vitamin.  Follow up in 1 week for labor induction.    Bianca Helms MD

## 2024-04-15 NOTE — ASSESSMENT & PLAN NOTE
- initial mild range BP today with normalization on recheck, asymptomatic, home BP values wnl.  Defer further eval/intervention at this time.  She will maintain low threshold to present to L&D for symptoms or if home BP values become elevated

## 2024-04-16 ENCOUNTER — APPOINTMENT (OUTPATIENT)
Dept: RADIOLOGY | Facility: CLINIC | Age: 34
End: 2024-04-16
Payer: COMMERCIAL

## 2024-04-22 ENCOUNTER — APPOINTMENT (OUTPATIENT)
Dept: RADIOLOGY | Facility: CLINIC | Age: 34
End: 2024-04-22
Payer: COMMERCIAL

## 2024-04-24 ENCOUNTER — HOSPITAL ENCOUNTER (INPATIENT)
Facility: HOSPITAL | Age: 34
LOS: 3 days | Discharge: HOME | End: 2024-04-27
Attending: STUDENT IN AN ORGANIZED HEALTH CARE EDUCATION/TRAINING PROGRAM | Admitting: NURSE PRACTITIONER
Payer: COMMERCIAL

## 2024-04-24 ENCOUNTER — ANESTHESIA EVENT (OUTPATIENT)
Dept: OBSTETRICS AND GYNECOLOGY | Facility: HOSPITAL | Age: 34
End: 2024-04-24
Payer: COMMERCIAL

## 2024-04-24 ENCOUNTER — APPOINTMENT (OUTPATIENT)
Dept: OBSTETRICS AND GYNECOLOGY | Facility: HOSPITAL | Age: 34
End: 2024-04-24
Payer: COMMERCIAL

## 2024-04-24 ENCOUNTER — ANESTHESIA (OUTPATIENT)
Dept: OBSTETRICS AND GYNECOLOGY | Facility: HOSPITAL | Age: 34
End: 2024-04-24
Payer: COMMERCIAL

## 2024-04-24 DIAGNOSIS — O10.919 CHRONIC HYPERTENSION AFFECTING PREGNANCY (HHS-HCC): ICD-10-CM

## 2024-04-24 PROBLEM — Z3A.37 37 WEEKS GESTATION OF PREGNANCY (HHS-HCC): Status: ACTIVE | Noted: 2024-01-23

## 2024-04-24 PROBLEM — Z34.90 ENCOUNTER FOR INDUCTION OF LABOR (HHS-HCC): Status: ACTIVE | Noted: 2024-04-24

## 2024-04-24 LAB
ABO GROUP (TYPE) IN BLOOD: NORMAL
ANTIBODY SCREEN: NORMAL
ERYTHROCYTE [DISTWIDTH] IN BLOOD BY AUTOMATED COUNT: 13 % (ref 11.5–14.5)
HCT VFR BLD AUTO: 37.3 % (ref 36–46)
HGB BLD-MCNC: 12.3 G/DL (ref 12–16)
HOLD SPECIMEN: NORMAL
MCH RBC QN AUTO: 30.5 PG (ref 26–34)
MCHC RBC AUTO-ENTMCNC: 33 G/DL (ref 32–36)
MCV RBC AUTO: 93 FL (ref 80–100)
NRBC BLD-RTO: 0 /100 WBCS (ref 0–0)
PLATELET # BLD AUTO: 290 X10*3/UL (ref 150–450)
RBC # BLD AUTO: 4.03 X10*6/UL (ref 4–5.2)
RH FACTOR (ANTIGEN D): NORMAL
TREPONEMA PALLIDUM IGG+IGM AB [PRESENCE] IN SERUM OR PLASMA BY IMMUNOASSAY: NONREACTIVE
WBC # BLD AUTO: 10.5 X10*3/UL (ref 4.4–11.3)

## 2024-04-24 PROCEDURE — 59050 FETAL MONITOR W/REPORT: CPT

## 2024-04-24 PROCEDURE — 01967 NEURAXL LBR ANES VAG DLVR: CPT

## 2024-04-24 PROCEDURE — 7210000002 HC LABOR PER HOUR

## 2024-04-24 PROCEDURE — 10907ZC DRAINAGE OF AMNIOTIC FLUID, THERAPEUTIC FROM PRODUCTS OF CONCEPTION, VIA NATURAL OR ARTIFICIAL OPENING: ICD-10-PCS | Performed by: STUDENT IN AN ORGANIZED HEALTH CARE EDUCATION/TRAINING PROGRAM

## 2024-04-24 PROCEDURE — 2500000005 HC RX 250 GENERAL PHARMACY W/O HCPCS

## 2024-04-24 PROCEDURE — 01967 NEURAXL LBR ANES VAG DLVR: CPT | Performed by: ANESTHESIOLOGY

## 2024-04-24 PROCEDURE — 2500000004 HC RX 250 GENERAL PHARMACY W/ HCPCS (ALT 636 FOR OP/ED)

## 2024-04-24 PROCEDURE — 3700000014 EPIDURAL BLOCK

## 2024-04-24 PROCEDURE — 3E0P7VZ INTRODUCTION OF HORMONE INTO FEMALE REPRODUCTIVE, VIA NATURAL OR ARTIFICIAL OPENING: ICD-10-PCS | Performed by: NURSE PRACTITIONER

## 2024-04-24 PROCEDURE — 3E033VJ INTRODUCTION OF OTHER HORMONE INTO PERIPHERAL VEIN, PERCUTANEOUS APPROACH: ICD-10-PCS

## 2024-04-24 PROCEDURE — 2500000001 HC RX 250 WO HCPCS SELF ADMINISTERED DRUGS (ALT 637 FOR MEDICARE OP): Performed by: NURSE PRACTITIONER

## 2024-04-24 PROCEDURE — 36415 COLL VENOUS BLD VENIPUNCTURE: CPT | Performed by: NURSE PRACTITIONER

## 2024-04-24 PROCEDURE — 4A1H7CZ MONITORING OF PRODUCTS OF CONCEPTION, CARDIAC RATE, VIA NATURAL OR ARTIFICIAL OPENING: ICD-10-PCS | Performed by: STUDENT IN AN ORGANIZED HEALTH CARE EDUCATION/TRAINING PROGRAM

## 2024-04-24 PROCEDURE — 86780 TREPONEMA PALLIDUM: CPT | Performed by: NURSE PRACTITIONER

## 2024-04-24 PROCEDURE — 1120000001 HC OB PRIVATE ROOM DAILY

## 2024-04-24 PROCEDURE — 86923 COMPATIBILITY TEST ELECTRIC: CPT

## 2024-04-24 PROCEDURE — 86901 BLOOD TYPING SEROLOGIC RH(D): CPT | Performed by: NURSE PRACTITIONER

## 2024-04-24 PROCEDURE — 85027 COMPLETE CBC AUTOMATED: CPT | Performed by: NURSE PRACTITIONER

## 2024-04-24 PROCEDURE — 2500000004 HC RX 250 GENERAL PHARMACY W/ HCPCS (ALT 636 FOR OP/ED): Performed by: NURSE PRACTITIONER

## 2024-04-24 RX ORDER — TERBUTALINE SULFATE 1 MG/ML
0.25 INJECTION SUBCUTANEOUS ONCE AS NEEDED
Status: DISCONTINUED | OUTPATIENT
Start: 2024-04-24 | End: 2024-04-25

## 2024-04-24 RX ORDER — FENTANYL/BUPIVACAINE/NS/PF 2MCG/ML-.1
PLASTIC BAG, INJECTION (ML) INJECTION AS NEEDED
Status: DISCONTINUED | OUTPATIENT
Start: 2024-04-24 | End: 2024-04-25

## 2024-04-24 RX ORDER — OXYTOCIN 10 [USP'U]/ML
10 INJECTION, SOLUTION INTRAMUSCULAR; INTRAVENOUS ONCE AS NEEDED
Status: DISCONTINUED | OUTPATIENT
Start: 2024-04-24 | End: 2024-04-25

## 2024-04-24 RX ORDER — PENICILLIN G 3000000 [IU]/50ML
3 INJECTION, SOLUTION INTRAVENOUS EVERY 4 HOURS
Status: DISCONTINUED | OUTPATIENT
Start: 2024-04-24 | End: 2024-04-25

## 2024-04-24 RX ORDER — MISOPROSTOL 200 UG/1
800 TABLET ORAL ONCE AS NEEDED
Status: DISCONTINUED | OUTPATIENT
Start: 2024-04-24 | End: 2024-04-25

## 2024-04-24 RX ORDER — CARBOPROST TROMETHAMINE 250 UG/ML
250 INJECTION, SOLUTION INTRAMUSCULAR ONCE AS NEEDED
Status: DISCONTINUED | OUTPATIENT
Start: 2024-04-24 | End: 2024-04-25

## 2024-04-24 RX ORDER — METHYLERGONOVINE MALEATE 0.2 MG/ML
0.2 INJECTION INTRAVENOUS ONCE AS NEEDED
Status: DISCONTINUED | OUTPATIENT
Start: 2024-04-24 | End: 2024-04-25

## 2024-04-24 RX ORDER — LABETALOL HYDROCHLORIDE 5 MG/ML
20 INJECTION, SOLUTION INTRAVENOUS ONCE AS NEEDED
Status: DISCONTINUED | OUTPATIENT
Start: 2024-04-24 | End: 2024-04-25

## 2024-04-24 RX ORDER — OXYTOCIN/0.9 % SODIUM CHLORIDE 30/500 ML
2-30 PLASTIC BAG, INJECTION (ML) INTRAVENOUS CONTINUOUS
Status: DISCONTINUED | OUTPATIENT
Start: 2024-04-24 | End: 2024-04-25

## 2024-04-24 RX ORDER — DIPHENHYDRAMINE HYDROCHLORIDE 50 MG/ML
25 INJECTION INTRAMUSCULAR; INTRAVENOUS ONCE
Status: COMPLETED | OUTPATIENT
Start: 2024-04-24 | End: 2024-04-24

## 2024-04-24 RX ORDER — NIFEDIPINE 60 MG/1
60 TABLET, FILM COATED, EXTENDED RELEASE ORAL
Status: DISCONTINUED | OUTPATIENT
Start: 2024-04-25 | End: 2024-04-27 | Stop reason: HOSPADM

## 2024-04-24 RX ORDER — FENTANYL/BUPIVACAINE/NS/PF 2MCG/ML-.1
PLASTIC BAG, INJECTION (ML) INJECTION CONTINUOUS PRN
Status: DISCONTINUED | OUTPATIENT
Start: 2024-04-24 | End: 2024-04-25

## 2024-04-24 RX ORDER — ACETAMINOPHEN 325 MG/1
975 TABLET ORAL EVERY 6 HOURS PRN
Status: DISCONTINUED | OUTPATIENT
Start: 2024-04-24 | End: 2024-04-25

## 2024-04-24 RX ORDER — TRANEXAMIC ACID 100 MG/ML
1000 INJECTION, SOLUTION INTRAVENOUS ONCE AS NEEDED
Status: DISCONTINUED | OUTPATIENT
Start: 2024-04-24 | End: 2024-04-25

## 2024-04-24 RX ORDER — OXYTOCIN/0.9 % SODIUM CHLORIDE 30/500 ML
60 PLASTIC BAG, INJECTION (ML) INTRAVENOUS
Status: DISCONTINUED | OUTPATIENT
Start: 2024-04-24 | End: 2024-04-25

## 2024-04-24 RX ORDER — NIFEDIPINE 10 MG/1
10 CAPSULE ORAL ONCE AS NEEDED
Status: DISCONTINUED | OUTPATIENT
Start: 2024-04-24 | End: 2024-04-25

## 2024-04-24 RX ORDER — SODIUM CHLORIDE, SODIUM LACTATE, POTASSIUM CHLORIDE, CALCIUM CHLORIDE 600; 310; 30; 20 MG/100ML; MG/100ML; MG/100ML; MG/100ML
125 INJECTION, SOLUTION INTRAVENOUS CONTINUOUS
Status: DISCONTINUED | OUTPATIENT
Start: 2024-04-24 | End: 2024-04-25

## 2024-04-24 RX ORDER — LOPERAMIDE HYDROCHLORIDE 2 MG/1
4 CAPSULE ORAL EVERY 2 HOUR PRN
Status: DISCONTINUED | OUTPATIENT
Start: 2024-04-24 | End: 2024-04-25

## 2024-04-24 RX ORDER — HYDRALAZINE HYDROCHLORIDE 20 MG/ML
5 INJECTION INTRAMUSCULAR; INTRAVENOUS ONCE AS NEEDED
Status: DISCONTINUED | OUTPATIENT
Start: 2024-04-24 | End: 2024-04-25

## 2024-04-24 RX ADMIN — PENICILLIN G 3 MILLION UNITS: 3000000 INJECTION, SOLUTION INTRAVENOUS at 17:20

## 2024-04-24 RX ADMIN — Medication 14 ML/HR: at 23:06

## 2024-04-24 RX ADMIN — PENICILLIN G 3 MILLION UNITS: 3000000 INJECTION, SOLUTION INTRAVENOUS at 13:16

## 2024-04-24 RX ADMIN — MISOPROSTOL 25 MCG: 100 TABLET ORAL at 10:31

## 2024-04-24 RX ADMIN — ACETAMINOPHEN 975 MG: 325 TABLET ORAL at 18:57

## 2024-04-24 RX ADMIN — SODIUM CHLORIDE, POTASSIUM CHLORIDE, SODIUM LACTATE AND CALCIUM CHLORIDE 500 ML: 600; 310; 30; 20 INJECTION, SOLUTION INTRAVENOUS at 22:35

## 2024-04-24 RX ADMIN — SODIUM CHLORIDE, POTASSIUM CHLORIDE, SODIUM LACTATE AND CALCIUM CHLORIDE 125 ML/HR: 600; 310; 30; 20 INJECTION, SOLUTION INTRAVENOUS at 09:21

## 2024-04-24 RX ADMIN — Medication 2 MILLI-UNITS/MIN: at 16:31

## 2024-04-24 RX ADMIN — PENICILLIN G POTASSIUM 5 MILLION UNITS: 5000000 INJECTION, POWDER, FOR SOLUTION INTRAMUSCULAR; INTRAVENOUS at 10:18

## 2024-04-24 RX ADMIN — PENICILLIN G 3 MILLION UNITS: 3000000 INJECTION, SOLUTION INTRAVENOUS at 21:21

## 2024-04-24 RX ADMIN — Medication 5 ML: at 23:02

## 2024-04-24 RX ADMIN — Medication 5 ML: at 22:59

## 2024-04-24 RX ADMIN — DIPHENHYDRAMINE HYDROCHLORIDE 25 MG: 50 INJECTION, SOLUTION INTRAMUSCULAR; INTRAVENOUS at 23:30

## 2024-04-24 SDOH — ECONOMIC STABILITY: TRANSPORTATION INSECURITY
IN THE PAST 12 MONTHS, HAS LACK OF TRANSPORTATION KEPT YOU FROM MEETINGS, WORK, OR FROM GETTING THINGS NEEDED FOR DAILY LIVING?: NO

## 2024-04-24 SDOH — HEALTH STABILITY: MENTAL HEALTH: STRENGTHS (MUST CHOOSE TWO): SUPPORT FROM ORGANIZED COMMUNITY;EDUCATION;STABLE HOUSING

## 2024-04-24 SDOH — ECONOMIC STABILITY: FOOD INSECURITY: WITHIN THE PAST 12 MONTHS, THE FOOD YOU BOUGHT JUST DIDN'T LAST AND YOU DIDN'T HAVE MONEY TO GET MORE.: NEVER TRUE

## 2024-04-24 SDOH — SOCIAL STABILITY: SOCIAL INSECURITY: HAVE YOU HAD ANY THOUGHTS OF HARMING ANYONE ELSE?: NO

## 2024-04-24 SDOH — HEALTH STABILITY: MENTAL HEALTH: HAVE YOU USED ANY SUBSTANCES (CANABIS, COCAINE, HEROIN, HALLUCINOGENS, INHALANTS, ETC.) IN THE PAST 12 MONTHS?: NO

## 2024-04-24 SDOH — SOCIAL STABILITY: SOCIAL INSECURITY: WITHIN THE LAST YEAR, HAVE YOU BEEN AFRAID OF YOUR PARTNER OR EX-PARTNER?: NO

## 2024-04-24 SDOH — HEALTH STABILITY: MENTAL HEALTH: HAVE YOU USED ANY PRESCRIPTION DRUGS OTHER THAN PRESCRIBED IN THE PAST 12 MONTHS?: NO

## 2024-04-24 SDOH — SOCIAL STABILITY: SOCIAL INSECURITY: HAS ANYONE EVER THREATENED TO HURT YOUR FAMILY OR YOUR PETS?: NO

## 2024-04-24 SDOH — SOCIAL STABILITY: SOCIAL INSECURITY
WITHIN THE LAST YEAR, HAVE YOU BEEN KICKED, HIT, SLAPPED, OR OTHERWISE PHYSICALLY HURT BY YOUR PARTNER OR EX-PARTNER?: NO

## 2024-04-24 SDOH — SOCIAL STABILITY: SOCIAL INSECURITY: ARE YOU OR HAVE YOU BEEN THREATENED OR ABUSED PHYSICALLY, EMOTIONALLY, OR SEXUALLY BY ANYONE?: NO

## 2024-04-24 SDOH — HEALTH STABILITY: MENTAL HEALTH: WISH TO BE DEAD (PAST 1 MONTH): NO

## 2024-04-24 SDOH — SOCIAL STABILITY: SOCIAL INSECURITY
WITHIN THE LAST YEAR, HAVE TO BEEN RAPED OR FORCED TO HAVE ANY KIND OF SEXUAL ACTIVITY BY YOUR PARTNER OR EX-PARTNER?: NO

## 2024-04-24 SDOH — SOCIAL STABILITY: SOCIAL INSECURITY: DO YOU FEEL ANYONE HAS EXPLOITED OR TAKEN ADVANTAGE OF YOU FINANCIALLY OR OF YOUR PERSONAL PROPERTY?: NO

## 2024-04-24 SDOH — SOCIAL STABILITY: SOCIAL INSECURITY: WITHIN THE LAST YEAR, HAVE YOU BEEN HUMILIATED OR EMOTIONALLY ABUSED IN OTHER WAYS BY YOUR PARTNER OR EX-PARTNER?: NO

## 2024-04-24 SDOH — HEALTH STABILITY: MENTAL HEALTH: SUICIDAL BEHAVIOR (LIFETIME): NO

## 2024-04-24 SDOH — HEALTH STABILITY: MENTAL HEALTH: HOW OFTEN DO YOU HAVE A DRINK CONTAINING ALCOHOL?: NEVER

## 2024-04-24 SDOH — HEALTH STABILITY: PHYSICAL HEALTH: ON AVERAGE, HOW MANY MINUTES DO YOU ENGAGE IN EXERCISE AT THIS LEVEL?: 10 MIN

## 2024-04-24 SDOH — HEALTH STABILITY: MENTAL HEALTH
STRESS IS WHEN SOMEONE FEELS TENSE, NERVOUS, ANXIOUS, OR CAN'T SLEEP AT NIGHT BECAUSE THEIR MIND IS TROUBLED. HOW STRESSED ARE YOU?: RATHER MUCH

## 2024-04-24 SDOH — SOCIAL STABILITY: SOCIAL INSECURITY: DOES ANYONE TRY TO KEEP YOU FROM HAVING/CONTACTING OTHER FRIENDS OR DOING THINGS OUTSIDE YOUR HOME?: NO

## 2024-04-24 SDOH — SOCIAL STABILITY: SOCIAL INSECURITY: ARE THERE ANY APPARENT SIGNS OF INJURIES/BEHAVIORS THAT COULD BE RELATED TO ABUSE/NEGLECT?: NO

## 2024-04-24 SDOH — HEALTH STABILITY: MENTAL HEALTH
HOW OFTEN DO YOU NEED TO HAVE SOMEONE HELP YOU WHEN YOU READ INSTRUCTIONS, PAMPHLETS, OR OTHER WRITTEN MATERIAL FROM YOUR DOCTOR OR PHARMACY?: NEVER

## 2024-04-24 SDOH — HEALTH STABILITY: MENTAL HEALTH: HOW MANY STANDARD DRINKS CONTAINING ALCOHOL DO YOU HAVE ON A TYPICAL DAY?: PATIENT DOES NOT DRINK

## 2024-04-24 SDOH — SOCIAL STABILITY: SOCIAL INSECURITY: VERBAL ABUSE: DENIES

## 2024-04-24 SDOH — SOCIAL STABILITY: SOCIAL INSECURITY: ABUSE SCREEN: ADULT

## 2024-04-24 SDOH — ECONOMIC STABILITY: FOOD INSECURITY: WITHIN THE PAST 12 MONTHS, YOU WORRIED THAT YOUR FOOD WOULD RUN OUT BEFORE YOU GOT MONEY TO BUY MORE.: NEVER TRUE

## 2024-04-24 SDOH — HEALTH STABILITY: MENTAL HEALTH: CURRENT SMOKER: 0

## 2024-04-24 SDOH — HEALTH STABILITY: PHYSICAL HEALTH: ON AVERAGE, HOW MANY DAYS PER WEEK DO YOU ENGAGE IN MODERATE TO STRENUOUS EXERCISE (LIKE A BRISK WALK)?: 1 DAY

## 2024-04-24 SDOH — SOCIAL STABILITY: SOCIAL INSECURITY: PHYSICAL ABUSE: DENIES

## 2024-04-24 SDOH — HEALTH STABILITY: MENTAL HEALTH: HOW OFTEN DO YOU HAVE 6 OR MORE DRINKS ON ONE OCCASION?: NEVER

## 2024-04-24 SDOH — ECONOMIC STABILITY: TRANSPORTATION INSECURITY
IN THE PAST 12 MONTHS, HAS THE LACK OF TRANSPORTATION KEPT YOU FROM MEDICAL APPOINTMENTS OR FROM GETTING MEDICATIONS?: NO

## 2024-04-24 SDOH — HEALTH STABILITY: MENTAL HEALTH: NON-SPECIFIC ACTIVE SUICIDAL THOUGHTS (PAST 1 MONTH): NO

## 2024-04-24 SDOH — ECONOMIC STABILITY: INCOME INSECURITY: HOW HARD IS IT FOR YOU TO PAY FOR THE VERY BASICS LIKE FOOD, HOUSING, MEDICAL CARE, AND HEATING?: NOT VERY HARD

## 2024-04-24 SDOH — HEALTH STABILITY: MENTAL HEALTH: WERE YOU ABLE TO COMPLETE ALL THE BEHAVIORAL HEALTH SCREENINGS?: YES

## 2024-04-24 SDOH — ECONOMIC STABILITY: HOUSING INSECURITY: DO YOU FEEL UNSAFE GOING BACK TO THE PLACE WHERE YOU ARE LIVING?: NO

## 2024-04-24 ASSESSMENT — PAIN SCALES - GENERAL
PAINLEVEL_OUTOF10: 5 - MODERATE PAIN
PAINLEVEL_OUTOF10: 5 - MODERATE PAIN
PAINLEVEL_OUTOF10: 0 - NO PAIN
PAINLEVEL_OUTOF10: 5 - MODERATE PAIN
PAINLEVEL_OUTOF10: 2
PAINLEVEL_OUTOF10: 6
PAINLEVEL_OUTOF10: 0 - NO PAIN
PAINLEVEL_OUTOF10: 3
PAINLEVEL_OUTOF10: 1
PAINLEVEL_OUTOF10: 0 - NO PAIN
PAINLEVEL_OUTOF10: 0 - NO PAIN

## 2024-04-24 ASSESSMENT — PAIN DESCRIPTION - ORIENTATION: ORIENTATION: RIGHT

## 2024-04-24 ASSESSMENT — PAIN DESCRIPTION - LOCATION: LOCATION: LEG

## 2024-04-24 ASSESSMENT — LIFESTYLE VARIABLES
AUDIT-C TOTAL SCORE: 0
SKIP TO QUESTIONS 9-10: 1

## 2024-04-24 NOTE — H&P
Obstetrical Admission History and Physical     Cheyenne Wolf is a 34 y.o.  at 37w3d. CORRINA: 2024, by Last Menstrual Period. Estimated fetal weight: 6#. She has had prenatal care with Dr. Helms .    Assessment    Cheyenne Wolf is a 34 y.o.  at 37w3d. CORRINA: 2024, by Last Menstrual Period.   FHT Category 1  Unfavorable cervix, for IOL  cHTN, stable on nifedipine 60mg, BP normotensive; h/o PEC in prior pregnancy  H/o FGR, resolved on most recent US 4/15, EFW 13%, AC 10%  Possible unilateral cleft lip seen on prior prenatal US, US on 3/8 demonstrating normal appearing lip  GBS pos  Plan    Admit to labor and delivery  Monitor vital signs per unit protocol  Routine labs ordered  Encourage frequent position changes  IOL with cytotec, and then pitocin/AROM if indicated.  Regular diet, clear liquid diet with epidural and initiation of pitocin  Continuous fetal monitoring  Pain management per patient request  Continue assessment of maternal and fetal well-being  Recheck as clinically indicted by maternal or fetal status  Anticipate SVB  Dr. Horn aware of admission    Juli Price, CA-CLAY, APRN-CNP    Subjective     Chief Complaint: Scheduled Induction    Cheyenne is here for schedule IOL in setting of cHTN, controlled on nifedipine. Feeling intermittent contractions. Good fetal movement. Denies vaginal bleeding. Denies leaking of fluid.       Pregnancy Problems (from 10/04/23 to present)       Problem Noted Resolved    Encounter for induction of labor ([facility]) 2024 by CA King-CLAY, APRN-CNP No    Priority:  High      Pregnancy affected by fetal growth restriction ([facility]) 3/25/2024 by Bianca Helms MD No    Priority:  Medium      Overview Addendum 3/25/2024  8:56 AM by Bianca Helms MD     - based on AC 8th%ile, overall EFW 1761g/13th%ile, on 3/21 (32 wga)  - for delivery 37w0d - 39w6d, favor earlier in time frame due to cHTN requiring medication and presence of  umbilical vein varix  - weekly  testing on going         Abnormal glucose tolerance test in pregnancy ([facility]) 2024 by Bianca Helms MD No    Priority:  Medium      Overview Addendum 3/11/2024  8:48 AM by Bianca Hlems MD     - 1 hr 154, 3 hr wnl         History of pre-eclampsia in prior pregnancy, currently pregnant ([facility]) 2024 by Griselda CAVAZOS MD No    Priority:  Medium      Overview Signed 2024 12:30 PM by Griselda CAVAZOS MD      delivery at 36 5/7 for sPEC.   - ASA daily         Chronic hypertension affecting pregnancy ([facility]) 10/2/2023 by Raman Cowan No    Priority:  Medium      Overview Addendum 4/15/2024 10:06 AM by Bianca Helms MD     - nifedipine 30 initiated at 11 wga with initial good control since, with up-titration to 60 mg daily, stable since  -  testing and delivery at 37 wga given mild range BP values on medication and umbilical vein varix  - IOL planned 24         37 weeks gestation of pregnancy ([facility]) 2024 by Bianca Helms MD No    Priority:  Low      Overview Addendum 4/15/2024 10:10 AM by Bianca Helms MD     [x] Dating: lmp c/w 11 wk US  [x] Initial BMI: 44  [x] Prenatal Labs: wnl  [x] Genetic Screening:  rr cf DNA  [x] Baby ASA: taking  [x] Anatomy US: wnl with exception of unilateral (L) cleft lip, palate appears unaffected but cannot be ruled out on US  [x] 1hr GCT at 24-28wks: abnormal 1hr (154), nml 3hr   [x] Tdap (27-36wks): 3/11/24  [x] Flu Shot: administered 10/25/23  [x] COVID vaccine: declines for now, counseled on rational for vaccination in pregnancy  [x] GBS at 36 wks: collected today, , given planned 37 wk delivery  [x] Breastfeeding: yes  [x] Postpartum Birth control method: intends condoms as bridge to partner vasectomy  [x] Mode/timing of delivery: IOL  at 37.3 wga for cHTN on meds and FGR           GBS (group B Streptococcus carrier), +RV culture, currently pregnant ([facility]) 10/2/2023 by Raman Cowan  No    Priority:  Low      Overview Signed 4/15/2024 10:07 AM by Bianca Helms MD     - aware, for PCN ppx         Abnormal fetal ultrasound 2024 by Griselda CAVAZOS MD 2024 by Bianca Helms MD    Overview Addendum 2024  7:04 PM by Griselda CAVAZOS MD     Unilateral cleft lip suspected on anatomic survey. Palate unable to be adequately assessed.   [x]  Fetal care clinic   [x]  Follow up completed anatomy  [x]   Referral to cranio-facial surgery for prenatal consultation in third trimester  [x]   Fetal care visit around 30 weeks  - 3/8 US demonstrating normal appearing upper lip and prior studies reviewed with poor image quality noted previously, possibly no cleft present           Obstetrical History   OB History    Para Term  AB Living   2 1 0 1 0 1   SAB IAB Ectopic Multiple Live Births   0 0 0 0 1      # Outcome Date GA Lbr Kyree/2nd Weight Sex Delivery Anes PTL Lv   2 Current            1  22 36w5d  2.58 kg M Vag-Spont  Y MORENA      Complications: Preeclampsia ([facility])       Past Medical History  Past Medical History:   Diagnosis Date    Chronic hypertension 10/02/2023    Started Nifedipine 10/4-30mg  Referral to [facility]     Contusion of right lesser toe(s) without damage to nail, initial encounter 2019    Contusion of toe, right    Depression     H/O pre-eclampsia in prior pregnancy, currently pregnant, third trimester ([facility])     Ovarian cyst 2023     delivery after induction of labor ([facility]) 2024        Past Surgical History   Past Surgical History:   Procedure Laterality Date    WISDOM TOOTH EXTRACTION Bilateral        Social History  Social History     Tobacco Use    Smoking status: Never    Smokeless tobacco: Never   Substance Use Topics    Alcohol use: Not Currently     Substance and Sexual Activity   Drug Use Never       Allergies  Patient has no known allergies.     Medications  Facility-Administered Medications Prior to Admission    Medication Dose Route Frequency Provider Last Rate Last Admin    aspirin EC tablet 162 mg  162 mg oral Once Lakshmi Orozco MD         Medications Prior to Admission   Medication Sig Dispense Refill Last Dose    acetaminophen (Tylenol) 500 mg tablet Take 2 tablets (1,000 mg) by mouth every 6 hours if needed.   4/23/2024    aspirin 81 mg chewable tablet Chew 1 tablet (81 mg) once daily. Chew and swallow   4/24/2024    FLUoxetine (PROzac) 40 mg capsule Take 2 capsules (80 mg) by mouth once daily. 60 capsule 1 4/24/2024    LORazepam (Ativan) 1 mg tablet Take 1-2 tablets PO daily PRN anxiety 20 tablet 1 4/23/2024    magnesium 250 mg tablet Take 1 tablet (250 mg) by mouth once daily at bedtime.   4/24/2024    NIFEdipine ER (Adalat CC) 60 mg 24 hr tablet Take 1 tablet (60 mg) by mouth once daily in the morning. Take before meals. Do not crush, chew, or split. 30 tablet 11 4/24/2024    prenatal comb no.42/folic acid (PRENA1 CHEW ORAL) Take 1 capsule by mouth once daily.   4/24/2024    traZODone (Desyrel) 50 mg tablet Take 1-2 tablets ( mg) by mouth once daily at bedtime. 60 tablet 1 4/24/2024       Objective    Last Vitals  Temp Pulse Resp BP MAP O2 Sat     (!) 202   136/81   98 %     Physical Examination    GENERAL: Examination reveals a well developed, well nourished, gravid female in no acute distress. She is alert and cooperative.  LUNGS:  normal respiratory effort  ABDOMEN: soft, gravid, nontender, nondistended, no abnormal masses, no epigastric pain  The fetus is in a vertex presentation, determined by ultrasound  VAGINA: normal appearing vagina with normal color and discharge and no lesions noted  CERVIX:  2 cm dilated, 50 effaced, -3 station  EXTREMITIES: no redness or tenderness in the calves or thighs, no edema  NEUROLOGICAL: alert, oriented, normal speech, no focal findings or movement disorder noted  PSYCHOLOGICAL: awake and alert; oriented to person, place, and time    Fetal Monitoring       Baseline FHR: 140 per minute  Variability: moderate  Accelerations: yes  Decelerations: none  TOCO:  rare    Lab Review  Lab Results   Component Value Date    HGB 12.5 03/11/2024     03/11/2024    ABO B 10/23/2023    LABRH POS 10/23/2023    GRPBSTREP (A) 04/08/2024     Isolated: Streptococcus agalactiae (Group B Streptococcus)

## 2024-04-24 NOTE — ANESTHESIA PREPROCEDURE EVALUATION
Patient: Cheyenne Wolf    Evaluation Method: In-person visit    Procedure Information    Date: 04/24/24  Procedure: Labor Consult         Relevant Problems   Cardiac   (+) Chronic hypertension affecting pregnancy ([facility])      Neuro   (+) PTSD (post-traumatic stress disorder)      GYN   (+) 37 weeks gestation of pregnancy ([facility])       Clinical information reviewed:   Tobacco  Allergies  Meds  Problems  Med Hx  Surg Hx   Fam Hx  Soc   Hx        NPO Detail:  No data recorded     OB/Gyn Evaluation    Present Pregnancy    Patient is pregnant now.   Obstetric History    (+) difficult neuraxial placement (One sided Epidural)          Physical Exam    Airway  Mallampati: I  TM distance: >3 FB  Neck ROM: full     Cardiovascular   Rate: normal     Dental    Pulmonary    Abdominal          Anesthesia Plan    History of general anesthesia?: yes  History of complications of general anesthesia?: no    ASA 2     epidural     The patient is not a current smoker.  Patient did not smoke on day of procedure.    Anesthetic plan and risks discussed with patient.  Use of blood products discussed with patient who consented to blood products.    Plan discussed with attending.

## 2024-04-24 NOTE — SIGNIFICANT EVENT
"Labor Check      S: Patient resting comfortably, amenable to cervical exam and AROM        O:  /64   Pulse 100   Temp 36 °C (96.8 °F) (Temporal)   Resp 18   Ht 1.575 m (5' 2\")   Wt 125 kg (276 lb 3.8 oz)   LMP 08/06/2023 (Exact Date)   SpO2 100%   BMI 50.52 kg/m²      Cervical Exam  Dilation: 3  Effacement (%): 50  Fetal Station: -3  OB Examiner: Rivera MAYNARD  Fetal Assessment  Movement: Present  Mode: Wireless Monitoring System  Baseline Fetal Heart Rate (bpm): 130 bpm  Baseline Classification: Normal  Variability: Moderate (Between 6 and 25 BPM)  Pattern: Accelerations  Pattern Observations: Pt bouncing on birthing ball  FHR Category: Category I  Multiple Births: No      Contraction Frequency: 1-4       A/P  - Latent Labor.   - s/p AROM, clr   - cHTN- Nifed 60mg  - CEFM Cat I currently  - Will now start Pit. Increase per protocol  - Continue position changes, as tolerated  - GBS positive - PCN  - Recheck as clinically indicated by maternal or fetal status or PRN   - Anticipate NSVB.      Pt. d/w Mono Xavier MD  PGY-1, Obstetrics & Gynecology   [facility]       "

## 2024-04-24 NOTE — PROGRESS NOTES
Intrapartum Progress Note    Assessment/Plan   Cheyenne Wolf is a 34 y.o.  at 37w3d here for term IOL.    IOL  - Latent labor  - s/p AROM for clear  - continue pitocin per protocol  - GBS pos, on PCN    cHTN  - h/o PEC in prior pregnancy  - nifed 60mg daily  - normotensive  - asx  - baseline HELLP labs wnl    Fetal well being  - EFW 2431g (5#)  - cEFM; cat I tracing currently    Maternal well being  - PPBC: partner vasectomy    Seen and discussed w/ Dr. Analia Winn MD PGY-1  Obstetrics & Gynecology      Principal Problem:    Encounter for induction of labor ([facility])  Active Problems:    Chronic hypertension affecting pregnancy ([facility])    GBS (group B Streptococcus carrier), +RV culture, currently pregnant ([facility])    PTSD (post-traumatic stress disorder)    Cleft lip and palate, fetal, affecting care of mother, antepartum ([facility])    37 weeks gestation of pregnancy ([facility])    Pregnancy affected by fetal growth restriction ([facility])    Pregnancy Problems (from 10/04/23 to present)       Problem Noted Resolved    Encounter for induction of labor ([facility]) 2024 by Juli Price V, APRN-CNM, APRN-CNP No    Priority:  Medium      Pregnancy affected by fetal growth restriction ([facility]) 3/25/2024 by Bianca Helms MD No    Priority:  Medium      Overview Addendum 3/25/2024  8:56 AM by Bianca Helms MD     - based on AC 8th%ile, overall EFW 1761g/13th%ile, on 3/21 (32 wga)  - for delivery 37w0d - 39w6d, favor earlier in time frame due to cHTN requiring medication and presence of umbilical vein varix  - weekly  testing on going         Abnormal glucose tolerance test in pregnancy ([facility]) 2024 by Bianca Helms MD No    Priority:  Medium      Overview Addendum 3/11/2024  8:48 AM by Bianca Helms MD     - 1 hr 154, 3 hr wnl         37 weeks gestation of pregnancy ([facility]) 2024 by Bianca Helms MD No    Priority:  Medium      Overview Addendum 4/15/2024 10:10 AM by Bianca CLARK  MD Analia     [x] Dating: lmp c/w 11 wk US  [x] Initial BMI: 44  [x] Prenatal Labs: wnl  [x] Genetic Screening:  rr cf DNA  [x] Baby ASA: taking  [x] Anatomy US: wnl with exception of unilateral (L) cleft lip, palate appears unaffected but cannot be ruled out on US  [x] 1hr GCT at 24-28wks: abnormal 1hr (154), nml 3hr   [x] Tdap (27-36wks): 3/11/24  [x] Flu Shot: administered 10/25/23  [x] COVID vaccine: declines for now, counseled on rational for vaccination in pregnancy  [x] GBS at 36 wks: collected today, , given planned 37 wk delivery  [x] Breastfeeding: yes  [x] Postpartum Birth control method: intends condoms as bridge to partner vasectomy  [x] Mode/timing of delivery: IOL  at 37.3 wga for cHTN on meds and FGR           History of pre-eclampsia in prior pregnancy, currently pregnant ([facility]) 2024 by Griselda CAVAZOS MD No    Priority:  Medium      Overview Signed 2024 12:30 PM by Griselda CAVAZOS MD      delivery at 36 5/7 for sPEC.   - ASA daily         Chronic hypertension affecting pregnancy ([facility]) 10/2/2023 by Raman Cowan No    Priority:  Medium      Overview Addendum 4/15/2024 10:06 AM by Bianca Helms MD     - nifedipine 30 initiated at 11 wga with initial good control since, with up-titration to 60 mg daily, stable since  -  testing and delivery at 37 wga given mild range BP values on medication and umbilical vein varix  - IOL planned 24         GBS (group B Streptococcus carrier), +RV culture, currently pregnant ([facility]) 10/2/2023 by Raman Cowan No    Priority:  Medium      Overview Signed 4/15/2024 10:07 AM by Bianca Helms MD     - aware, for PCN ppx         Abnormal fetal ultrasound 2024 by Griselda CAVAZOS MD 2024 by Bianca Helms MD    Overview Addendum 2024  7:04 PM by Griselda CAVAZOS MD     Unilateral cleft lip suspected on anatomic survey. Palate unable to be adequately assessed.   [x]  Fetal care clinic   [x]  Follow up  completed anatomy  []  Referral to cranio-facial surgery for prenatal consultation in third trimester  []  Fetal care visit around 30 weeks                 Subjective   Patient feeling more pressure. Waiting to get epidural. Requesting SVE.    Objective   Last Vitals:  Temp Pulse Resp BP MAP Pulse Ox   36.5 °C (97.7 °F) 92 20 132/76   100 %     Vitals Min/Max Last 24 Hours:  Temp  Min: 35.8 °C (96.4 °F)  Max: 36.5 °C (97.7 °F)  Pulse  Min: 92  Max: 202  Resp  Min: 18  Max: 22  BP  Min: 105/61  Max: 143/91    Intake/Output:  No intake or output data in the 24 hours ending 04/24/24 1934    Physical Examination:  GENERAL: Examination reveals a well developed, well nourished, gravid female in no acute distress. She is alert and cooperative.  HEENT: External ears normal. Nose normal, no erythema or discharge. Mouth and throat clear.  NECK: supple, no significant adenopathy  LUNGS: Normal respiratory effort  HEART: RRR  ABDOMEN: Gravid  VAGINA: normal appearing vagina with normal color and discharge and no lesions noted  EXTREMITIES: no limitation in range of motion  SKIN: normal coloration and turgor, no rashes  NEUROLOGICAL: alert, oriented, normal speech, no focal findings or movement disorder noted  PSYCHOLOGICAL: awake and alert; oriented to person, place, and time    Cervical Exam  Dilation: 4  Effacement (%): 80  Fetal Station: -2  Method: Manual  OB Examiner: Pa MAYNARD  Fetal Assessment  Movement: Present  Mode: External US  Baseline Fetal Heart Rate (bpm): 125 bpm  Baseline Classification: Normal  Variability: Moderate (Between 6 and 25 BPM)  Pattern: Accelerations  Pattern Observations: RN at bedside adjusting EFM  FHR Category: Category I  Multiple Births: No

## 2024-04-25 LAB
BASE EXCESS BLDCOV CALC-SCNC: -2.4 MMOL/L (ref -8.1–-0.5)
BODY TEMPERATURE: 37 DEGREES CELSIUS
HCO3 BLDCOV-SCNC: 23.2 MMOL/L (ref 16–26)
INHALED O2 CONCENTRATION: 21 %
OXYHGB MFR BLDCOV: 74.1 % (ref 94–98)
PCO2 BLDCOV: 42 MM HG (ref 22–53)
PH BLDCOV: 7.35 PH (ref 7.19–7.47)
PO2 BLDCOV: 40 MM HG (ref 13–37)
SAO2 % BLDCOV: 76 % (ref 16–84)

## 2024-04-25 PROCEDURE — 59410 OBSTETRICAL CARE: CPT | Performed by: STUDENT IN AN ORGANIZED HEALTH CARE EDUCATION/TRAINING PROGRAM

## 2024-04-25 PROCEDURE — 7210000002 HC LABOR PER HOUR

## 2024-04-25 PROCEDURE — 7100000016 HC LABOR RECOVERY PER HOUR

## 2024-04-25 PROCEDURE — 82805 BLOOD GASES W/O2 SATURATION: CPT | Performed by: NURSE PRACTITIONER

## 2024-04-25 PROCEDURE — 59409 OBSTETRICAL CARE: CPT | Performed by: STUDENT IN AN ORGANIZED HEALTH CARE EDUCATION/TRAINING PROGRAM

## 2024-04-25 PROCEDURE — 88307 TISSUE EXAM BY PATHOLOGIST: CPT | Mod: TC,SUR | Performed by: STUDENT IN AN ORGANIZED HEALTH CARE EDUCATION/TRAINING PROGRAM

## 2024-04-25 PROCEDURE — 2500000006 HC RX 250 W HCPCS SELF ADMINISTERED DRUGS (ALT 637 FOR ALL PAYERS): Performed by: STUDENT IN AN ORGANIZED HEALTH CARE EDUCATION/TRAINING PROGRAM

## 2024-04-25 PROCEDURE — 88307 TISSUE EXAM BY PATHOLOGIST: CPT | Performed by: STUDENT IN AN ORGANIZED HEALTH CARE EDUCATION/TRAINING PROGRAM

## 2024-04-25 PROCEDURE — 2500000005 HC RX 250 GENERAL PHARMACY W/O HCPCS: Performed by: STUDENT IN AN ORGANIZED HEALTH CARE EDUCATION/TRAINING PROGRAM

## 2024-04-25 PROCEDURE — 1100000001 HC PRIVATE ROOM DAILY

## 2024-04-25 PROCEDURE — 2500000001 HC RX 250 WO HCPCS SELF ADMINISTERED DRUGS (ALT 637 FOR MEDICARE OP): Performed by: NURSE PRACTITIONER

## 2024-04-25 PROCEDURE — 0HQ9XZZ REPAIR PERINEUM SKIN, EXTERNAL APPROACH: ICD-10-PCS | Performed by: STUDENT IN AN ORGANIZED HEALTH CARE EDUCATION/TRAINING PROGRAM

## 2024-04-25 PROCEDURE — 2500000004 HC RX 250 GENERAL PHARMACY W/ HCPCS (ALT 636 FOR OP/ED): Performed by: NURSE PRACTITIONER

## 2024-04-25 PROCEDURE — 2500000001 HC RX 250 WO HCPCS SELF ADMINISTERED DRUGS (ALT 637 FOR MEDICARE OP): Performed by: STUDENT IN AN ORGANIZED HEALTH CARE EDUCATION/TRAINING PROGRAM

## 2024-04-25 RX ORDER — FLUOXETINE HYDROCHLORIDE 20 MG/1
80 CAPSULE ORAL DAILY
Status: DISCONTINUED | OUTPATIENT
Start: 2024-04-25 | End: 2024-04-27 | Stop reason: HOSPADM

## 2024-04-25 RX ORDER — ONDANSETRON 4 MG/1
4 TABLET, FILM COATED ORAL EVERY 6 HOURS PRN
Status: DISCONTINUED | OUTPATIENT
Start: 2024-04-25 | End: 2024-04-27 | Stop reason: HOSPADM

## 2024-04-25 RX ORDER — HYDRALAZINE HYDROCHLORIDE 20 MG/ML
5 INJECTION INTRAMUSCULAR; INTRAVENOUS ONCE AS NEEDED
Status: DISCONTINUED | OUTPATIENT
Start: 2024-04-25 | End: 2024-04-27 | Stop reason: HOSPADM

## 2024-04-25 RX ORDER — ONDANSETRON HYDROCHLORIDE 2 MG/ML
4 INJECTION, SOLUTION INTRAVENOUS EVERY 6 HOURS PRN
Status: DISCONTINUED | OUTPATIENT
Start: 2024-04-25 | End: 2024-04-27 | Stop reason: HOSPADM

## 2024-04-25 RX ORDER — CARBOPROST TROMETHAMINE 250 UG/ML
250 INJECTION, SOLUTION INTRAMUSCULAR ONCE AS NEEDED
Status: DISCONTINUED | OUTPATIENT
Start: 2024-04-25 | End: 2024-04-27 | Stop reason: HOSPADM

## 2024-04-25 RX ORDER — BISACODYL 10 MG/1
10 SUPPOSITORY RECTAL DAILY PRN
Status: DISCONTINUED | OUTPATIENT
Start: 2024-04-25 | End: 2024-04-27 | Stop reason: HOSPADM

## 2024-04-25 RX ORDER — ACETAMINOPHEN 325 MG/1
975 TABLET ORAL EVERY 6 HOURS
Status: DISCONTINUED | OUTPATIENT
Start: 2024-04-25 | End: 2024-04-27 | Stop reason: HOSPADM

## 2024-04-25 RX ORDER — METOCLOPRAMIDE 10 MG/1
10 TABLET ORAL EVERY 6 HOURS PRN
Status: DISCONTINUED | OUTPATIENT
Start: 2024-04-25 | End: 2024-04-25

## 2024-04-25 RX ORDER — MISOPROSTOL 200 UG/1
800 TABLET ORAL ONCE AS NEEDED
Status: DISCONTINUED | OUTPATIENT
Start: 2024-04-25 | End: 2024-04-27 | Stop reason: HOSPADM

## 2024-04-25 RX ORDER — METOCLOPRAMIDE HYDROCHLORIDE 5 MG/ML
10 INJECTION INTRAMUSCULAR; INTRAVENOUS EVERY 6 HOURS PRN
Status: DISCONTINUED | OUTPATIENT
Start: 2024-04-25 | End: 2024-04-25

## 2024-04-25 RX ORDER — TRANEXAMIC ACID 100 MG/ML
1000 INJECTION, SOLUTION INTRAVENOUS ONCE AS NEEDED
Status: DISCONTINUED | OUTPATIENT
Start: 2024-04-25 | End: 2024-04-27 | Stop reason: HOSPADM

## 2024-04-25 RX ORDER — OXYTOCIN 10 [USP'U]/ML
10 INJECTION, SOLUTION INTRAMUSCULAR; INTRAVENOUS ONCE AS NEEDED
Status: DISCONTINUED | OUTPATIENT
Start: 2024-04-25 | End: 2024-04-27 | Stop reason: HOSPADM

## 2024-04-25 RX ORDER — LOPERAMIDE HYDROCHLORIDE 2 MG/1
4 CAPSULE ORAL EVERY 2 HOUR PRN
Status: DISCONTINUED | OUTPATIENT
Start: 2024-04-25 | End: 2024-04-27 | Stop reason: HOSPADM

## 2024-04-25 RX ORDER — LIDOCAINE 560 MG/1
1 PATCH PERCUTANEOUS; TOPICAL; TRANSDERMAL
Status: DISCONTINUED | OUTPATIENT
Start: 2024-04-25 | End: 2024-04-27 | Stop reason: HOSPADM

## 2024-04-25 RX ORDER — LABETALOL HYDROCHLORIDE 5 MG/ML
20 INJECTION, SOLUTION INTRAVENOUS ONCE AS NEEDED
Status: DISCONTINUED | OUTPATIENT
Start: 2024-04-25 | End: 2024-04-27 | Stop reason: HOSPADM

## 2024-04-25 RX ORDER — NIFEDIPINE 60 MG/1
60 TABLET, FILM COATED, EXTENDED RELEASE ORAL
Status: DISCONTINUED | OUTPATIENT
Start: 2024-04-25 | End: 2024-04-27 | Stop reason: HOSPADM

## 2024-04-25 RX ORDER — OXYTOCIN/0.9 % SODIUM CHLORIDE 30/500 ML
60 PLASTIC BAG, INJECTION (ML) INTRAVENOUS ONCE AS NEEDED
Status: DISCONTINUED | OUTPATIENT
Start: 2024-04-25 | End: 2024-04-27 | Stop reason: HOSPADM

## 2024-04-25 RX ORDER — IBUPROFEN 600 MG/1
600 TABLET ORAL EVERY 6 HOURS
Status: DISCONTINUED | OUTPATIENT
Start: 2024-04-25 | End: 2024-04-27 | Stop reason: HOSPADM

## 2024-04-25 RX ORDER — LIDOCAINE HYDROCHLORIDE 10 MG/ML
30 INJECTION INFILTRATION; PERINEURAL ONCE AS NEEDED
Status: DISCONTINUED | OUTPATIENT
Start: 2024-04-25 | End: 2024-04-25

## 2024-04-25 RX ORDER — SIMETHICONE 80 MG
80 TABLET,CHEWABLE ORAL 4 TIMES DAILY PRN
Status: DISCONTINUED | OUTPATIENT
Start: 2024-04-25 | End: 2024-04-27 | Stop reason: HOSPADM

## 2024-04-25 RX ORDER — METHYLERGONOVINE MALEATE 0.2 MG/ML
0.2 INJECTION INTRAVENOUS ONCE AS NEEDED
Status: DISCONTINUED | OUTPATIENT
Start: 2024-04-25 | End: 2024-04-27 | Stop reason: HOSPADM

## 2024-04-25 RX ORDER — POLYETHYLENE GLYCOL 3350 17 G/17G
17 POWDER, FOR SOLUTION ORAL 2 TIMES DAILY PRN
Status: DISCONTINUED | OUTPATIENT
Start: 2024-04-25 | End: 2024-04-27 | Stop reason: HOSPADM

## 2024-04-25 RX ORDER — DIPHENHYDRAMINE HCL 25 MG
25 CAPSULE ORAL EVERY 6 HOURS PRN
Status: DISCONTINUED | OUTPATIENT
Start: 2024-04-25 | End: 2024-04-27 | Stop reason: HOSPADM

## 2024-04-25 RX ORDER — ENOXAPARIN SODIUM 100 MG/ML
80 INJECTION SUBCUTANEOUS EVERY 24 HOURS
Status: DISCONTINUED | OUTPATIENT
Start: 2024-04-26 | End: 2024-04-27 | Stop reason: HOSPADM

## 2024-04-25 RX ORDER — NIFEDIPINE 10 MG/1
10 CAPSULE ORAL ONCE AS NEEDED
Status: DISCONTINUED | OUTPATIENT
Start: 2024-04-25 | End: 2024-04-27 | Stop reason: HOSPADM

## 2024-04-25 RX ORDER — DIPHENHYDRAMINE HYDROCHLORIDE 50 MG/ML
25 INJECTION INTRAMUSCULAR; INTRAVENOUS EVERY 6 HOURS PRN
Status: DISCONTINUED | OUTPATIENT
Start: 2024-04-25 | End: 2024-04-27 | Stop reason: HOSPADM

## 2024-04-25 RX ORDER — ADHESIVE BANDAGE
10 BANDAGE TOPICAL
Status: DISCONTINUED | OUTPATIENT
Start: 2024-04-25 | End: 2024-04-27 | Stop reason: HOSPADM

## 2024-04-25 RX ADMIN — BENZOCAINE AND LEVOMENTHOL 1 APPLICATION: 200; 5 SPRAY TOPICAL at 06:03

## 2024-04-25 RX ADMIN — ACETAMINOPHEN 975 MG: 325 TABLET ORAL at 10:55

## 2024-04-25 RX ADMIN — ACETAMINOPHEN 975 MG: 325 TABLET ORAL at 04:11

## 2024-04-25 RX ADMIN — FLUOXETINE 80 MG: 20 CAPSULE ORAL at 08:44

## 2024-04-25 RX ADMIN — IBUPROFEN 600 MG: 600 TABLET, FILM COATED ORAL at 04:11

## 2024-04-25 RX ADMIN — Medication 1 EACH: at 06:03

## 2024-04-25 RX ADMIN — IBUPROFEN 600 MG: 600 TABLET, FILM COATED ORAL at 10:55

## 2024-04-25 RX ADMIN — IBUPROFEN 600 MG: 600 TABLET, FILM COATED ORAL at 22:10

## 2024-04-25 RX ADMIN — NIFEDIPINE 60 MG: 60 TABLET, FILM COATED, EXTENDED RELEASE ORAL at 06:39

## 2024-04-25 RX ADMIN — ACETAMINOPHEN 975 MG: 325 TABLET ORAL at 22:10

## 2024-04-25 RX ADMIN — IBUPROFEN 600 MG: 600 TABLET, FILM COATED ORAL at 16:31

## 2024-04-25 RX ADMIN — ACETAMINOPHEN 975 MG: 325 TABLET ORAL at 16:31

## 2024-04-25 RX ADMIN — PENICILLIN G 3 MILLION UNITS: 3000000 INJECTION, SOLUTION INTRAVENOUS at 01:14

## 2024-04-25 ASSESSMENT — PAIN SCALES - GENERAL
PAINLEVEL_OUTOF10: 0 - NO PAIN
PAINLEVEL_OUTOF10: 6
PAINLEVEL_OUTOF10: 4
PAINLEVEL_OUTOF10: 0 - NO PAIN
PAINLEVEL_OUTOF10: 0 - NO PAIN

## 2024-04-25 NOTE — DISCHARGE INSTRUCTIONS
.Post Birth Warning Signs  Any woman can have complications after the birth of a baby including a blood clot, a heart problem, hypertensive disorder/eclampsia, depression, hemorrhage, or infection. Notify all providers of your delivery date up to one year after birth.*       Call 911 or go to nearest emergency room right away if you have: PAIN or pressure in chest; OBSTRUCTED breathing or shortness of breath; SEIZURES; THOUGHTS of hurting yourself or your baby; heart palpitations/racing; change in alertness/confusion.    Call your provider if you have: BLEEDING, soaking through a pad/hour, or blood clots the size of an egg or bigger; INCISION (episiotomy stitches or  site) that is not healing (increased redness, pain, drainage/pus, or separation); RED or swollen leg/calf that is painful or warm to touch, especially in one leg more than the other; TEMPERATURE of 100.4 F or higher or chills; HEADACHE that does not get better with medicine, rest or hydration, or bad headache with vision changes like spots or flashing lights; increased swelling of face, hands or legs; severe cramps or upper right belly pain; red or swollen breast that is painful or warm to touch; an unusual, foul odor from your vaginal discharge; pain, burning, or difficulty during urination; severe constipation (more than 5 days); feelings of depression (such as depressed mood, loss of interest in enjoyable things, unable to care for yourself, trouble sleeping, lack of appetite, or feeling worthless).     If you can’t reach your provider or symptoms worsen, call 911 or go to nearest emergency room.   *Information obtained from [facility]s: Save Your Life: Get Care for These POST-BIRTH Warning Signs              Pacifier Use  “The American Academy of Pediatrics recommends that pacifier use is best avoided during the initiation of breastfeeding and used only after breastfeeding is well established.  In some infants, early pacifier use may interfere  with establishment of good breastfeeding practices, whereas in others it may indicate the presence of a breastfeeding problem that requires intervention.  Use of a pacifier may cause problems with latching, and lead to decreased milk supply by missing feeding opportunities.  Pacifiers may be used during painful procedures, but are not otherwise recommended while the infant is learning to breastfeed.”        .Any woman can have complications after the birth of a baby including a blood clot, a heart problem, hypertensive disorder/eclampsia, depression, hemorrhage, or infection. Notify all providers of your delivery date up to one year after birth.*       Call 911 or go to nearest emergency room right away if you have: PAIN or pressure in chest; OBSTRUCTED breathing or shortness of breath; SEIZURES; THOUGHTS of hurting yourself or your baby; heart palpitations/racing; change in alertness/confusion.    Call your provider if you have: BLEEDING, soaking through a pad/hour, or blood clots the size of an egg or bigger; INCISION (episiotomy stitches or  site) that is not healing (increased redness, pain, drainage/pus, or separation); RED or swollen leg/calf that is painful or warm to touch, especially in one leg more than the other; TEMPERATURE of 100.4 F or higher or chills; HEADACHE that does not get better with medicine, rest or hydration, or bad headache with vision changes like spots or flashing lights; increased swelling of face, hands or legs; severe cramps or upper right belly pain; red or swollen breast that is painful or warm to touch; an unusual, foul odor from your vaginal discharge; pain, burning, or difficulty during urination; severe constipation (more than 5 days); feelings of depression (such as depressed mood, loss of interest in enjoyable things, unable to care for yourself, trouble sleeping, lack of appetite, or feeling worthless).     If you can’t reach your provider or symptoms worsen, call [phone]  or go to nearest emergency room.   *Information obtained from [facility]: Save Your Life: Get Care for These POST-BIRTH Warning Signs  .“The American Academy of Pediatrics recommends that pacifier use is best avoided during the initiation of breastfeeding and used only after breastfeeding is well established.  In some infants, early pacifier use may interfere with establishment of good breastfeeding practices, whereas in others it may indicate the presence of a breastfeeding problem that requires intervention.  Use of a pacifier may cause problems with latching, and lead to decreased milk supply by missing feeding opportunities.  Pacifiers may be used during painful procedures, but are not otherwise recommended while the infant is learning to breastfeed.”

## 2024-04-25 NOTE — SIGNIFICANT EVENT
BP cuff and Home Monitoring  Patient meets criteria for home monitoring of blood pressure post discharge.  Reason:  history of chronic hypertension. Met with patient to assess for availability of home BP monitor.  Patient stated she owns home BP monitor. . Patient educated on importance of continuing to monitor BP at home, recording BP on home monitoring log and s/sx of when to call her provider.  Pt verbalized understanding the above information.

## 2024-04-25 NOTE — L&D DELIVERY NOTE
OB Delivery Note  2024  Cheyenne Wolf  34 y.o.   Vaginal, Spontaneous        Gestational Age: 37w4d  /Para:   Quantitative Blood Loss: Admission to Discharge: 303 mL (2024  8:05 AM - 2024  2:50 AM)    Juma Wolf [[id number]]      Labor Events    Rupture date/time: 2024 1559  Rupture type: Artificial  Fluid color: Clear  Fluid odor: None  Labor type: Induced Onset of Labor  Labor allowed to proceed with plans for an attempted vaginal birth?: Yes  Induction: Misoprostol, Oxytocin, AROM  First cervical ripening date/time: 2024  Induction date/time: 2024 08  Induction indications: Hypertensive Disorder of Pregnancy  Complications: None       Labor Event Times    Labor onset date/time: 2024 08  Dilation complete date/time: 2024 0236  Start pushing date/time: 2024 0250       Labor Length    1st stage: 18h 31m  2nd stage: 0h 20m  3rd stage: 0h 08m       Placenta    Placenta delivery date/time: 2024 0304  Placenta removal: Spontaneous  Placenta appearance: Intact  Placenta disposition: pathology       Cord    Vessels: 3 vessels  Complications: None  Delayed cord clamping?: Yes  Cord clamped date/time: 2024  Cord blood disposition: Lab  Gases sent?: Yes  Stem cell collection (by provider): No       Lacerations    Episiotomy: None  Perineal laceration: 1st  Perineal laceration repaired?: No  Other lacerations?: No  Repair suture: None       Anesthesia    Method: Epidural       Operative Delivery    Forceps attempted?: No  Vacuum extractor attempted?: No       Shoulder Dystocia    Shoulder dystocia present?: No       Radisson Delivery    Time head delivered: 2024 02:55:00  Birth date/time: 2024 02:56:00  Delivery type: Vaginal, Spontaneous  Complications: None       Resuscitation    Method: Suctioning, Chest compressions, Tactile stimulation, Supplemental oxygen       Apgars    Living status: Living  Apgar Component Scores:  1 min.:   5 min.:  10 min.:  15 min.:  20 min.:    Skin color:  0  1       Heart rate:  2  2       Reflex irritability:  2  2       Muscle tone:  2  2       Respiratory effort:  1  1       Total:  7  8       Apgars assigned by: KELBY KNIGHT       Delivery Providers    Delivering clinician: Bianca Helms MD   Provider Role    Tierra Zafar RN Delivery Nurse    Eliel Zimmerman, ANTONIA Nursery Nurse    Rebekah Blood MD Resident                 Bianca Helms MD

## 2024-04-25 NOTE — LACTATION NOTE
Lactation Consultant Note  Lactation Consultation  Reason for Consult: Initial assessment, NICU baby  Consultant Name: BRENDON Mayfield, IBCLC    Maternal Information       Maternal Assessment       Infant Assessment       Feeding Assessment       LATCH TOOL       Breast Pump       Other OB Lactation Tools       Patient Follow-up       Other OB Lactation Documentation       Recommendations/Summary    Here to talk with this mother whose infant is in the NICU. The mother was not in her room. A letter was left.

## 2024-04-25 NOTE — ANESTHESIA PREPROCEDURE EVALUATION
Patient: Cheyenne Wolf    Evaluation Method: In-person visit    Procedure Information    Date: 04/24/24  Procedure: Labor Consult         Relevant Problems   Cardiac   (+) Chronic hypertension affecting pregnancy ([facility])      Neuro   (+) PTSD (post-traumatic stress disorder)      GYN   (+) 37 weeks gestation of pregnancy ([facility])       Clinical information reviewed:   Tobacco  Allergies  Meds  Problems  Med Hx  Surg Hx   Fam Hx  Soc   Hx        NPO Detail:  No data recorded     OB/Gyn Evaluation    Present Pregnancy    Patient is pregnant now.   Obstetric History    (+) difficult neuraxial placement (One sided Epidural)              Physical Exam    Airway  Mallampati: I  TM distance: >3 FB  Neck ROM: full     Cardiovascular   Rate: normal     Dental    Pulmonary    Abdominal            Anesthesia Plan    History of general anesthesia?: yes  History of complications of general anesthesia?: no    ASA 2     epidural     The patient is not a current smoker.  Patient did not smoke on day of procedure.    Anesthetic plan and risks discussed with patient.  Use of blood products discussed with patient who consented to blood products.    Plan discussed with attending.

## 2024-04-25 NOTE — ANESTHESIA PROCEDURE NOTES
Epidural Block    Patient location during procedure: OB  Start time: 4/24/2024 10:44 PM  End time: 4/24/2024 10:57 PM  Reason for block: labor analgesia  Staffing  Performed: MAYDA   Authorized by: MAYDA Lovelace    Performed by: MAYDA Lovelace    Preanesthetic Checklist  Completed: patient identified, IV checked, risks and benefits discussed, surgical consent, pre-op evaluation, timeout performed and sterile techniques followed  Block Timeout  RN/Licensed healthcare professional reads aloud to the Anesthesia provider and entire team: Patient identity, procedure with side and site, patient position, and as applicable the availability of implants/special equipment/special requirements.  Patient on coagulant treatment: no  Timeout performed at: 4/24/2024 10:44 PM  Block Placement  Patient position: sitting  Prep: ChloraPrep  Sterility prep: mask, gloves, drape and cap  Sedation level: no sedation  Patient monitoring: heart rate, continuous pulse oximetry and blood pressure  Approach: midline  Local numbing: lidocaine 1% to skin and subcutaneous tissues  Vertebral space: lumbar  Lumbar location: L3-L4  Epidural  Loss of resistance technique: saline  Guidance: landmark technique        Needle  Needle type: Tuohy   Needle gauge: 17  Needle length: 8.9cm  Needle insertion depth: 6 cm  Catheter type: multi-orifice  Catheter size: 19 G  Catheter at skin depth: 11 cm  Catheter securement method: clear occlusive dressing and liquid medical adhesive    Test dose: lidocaine 1.5% with epinephrine 1-to-200,000  Test dose: lidocaine 1.5% with epinephrine 1-to-200,000  Test dose result: no positive test dose    PCEA  Medication concentration used: 0.044% Bupivacaine with 1.25 mcg/mL Fentanyl and 1:[id number] Epinephrine  Dose (mL): 10  Lockout (minutes): 15  1-Hour Limit (boluses/hr): 4  Basal Rate: 14        Assessment  Sensory level: T8. bilateral  Block outcome: patient comfortable  Number of attempts: 1  Events:  no positive test dose  Procedure assessment: patient tolerated procedure well with no immediate complications

## 2024-04-25 NOTE — LACTATION NOTE
This note was copied from a baby's chart.  Lactation Consultant Note  Lactation Consultation   Cindy Arzate, RN IBCLC    Maternal Information   Mom 's second child.  She reports that she has nursed before and she knows how to bring in her milk supply.  Her goal is to both breastfeed and use formula as needed.     Breast Pump   Mom has a pump for home use.  She has not started pumping as of yet however the baby latched at [facility] prior to his arrival in the NICU.     Recommendations/Summary       I spoke with parents at pt's bedside to explained availability of Lactation Consult services. Provided written patient education instruction materials on the listed topics: KRISTINE, Benefits of mother's own milk for the infant, breast massage and hand expression,CDC pump cleaning & sanitizing guidelines. Mom reports that she has nursed the baby prior to his arrival in the NICU.  She has breast fed before and is confident in her nursing skills.  The mom was provided all the equipment for pumping at the baby's bedside in the NICU.  Visitors for the baby came during this discussion so mom was going to work on pumping following their visit.  Invited to contact  services as needed.

## 2024-04-25 NOTE — SIGNIFICANT EVENT
Cervical exam and FSE placement:  Difficulty maintaining EFM has been encountered due to significant fetal movement, and multiple different external monitor types tried without success.  Counseled patient that, while fetal movement is considered indicative of normal acid/base status and good fetal oxygenation, in order to safely continue inducing labor, I recommend FSE placement for continuous heart rate monitoring.  Reviewed monitor function and rationale for continuous monitoring in a labor induction vs if she were in spontaneous labor.  Discussed small associated risk of infection due to foreign body traversing the vagina and entering the uterus, however advised of overall safety of use.  She expressed understanding and agreed to placement.    SVE 5/90/-2, FSE placed without difficulty  [place] tracing well externally and demonstrating q 2-3 min contractions    Following FSE placement, fht baseline 125 BPM, moderate variability present, accelerations present, and decelerations absent.    She wishes to proceed now with epidural placement and anesthesiology staff are at bedside.    Continue current care.    Bianca Helms MD

## 2024-04-26 PROCEDURE — 2500000001 HC RX 250 WO HCPCS SELF ADMINISTERED DRUGS (ALT 637 FOR MEDICARE OP): Performed by: STUDENT IN AN ORGANIZED HEALTH CARE EDUCATION/TRAINING PROGRAM

## 2024-04-26 PROCEDURE — 1100000001 HC PRIVATE ROOM DAILY

## 2024-04-26 PROCEDURE — 2500000001 HC RX 250 WO HCPCS SELF ADMINISTERED DRUGS (ALT 637 FOR MEDICARE OP): Performed by: NURSE PRACTITIONER

## 2024-04-26 PROCEDURE — 2500000001 HC RX 250 WO HCPCS SELF ADMINISTERED DRUGS (ALT 637 FOR MEDICARE OP): Performed by: FAMILY MEDICINE

## 2024-04-26 PROCEDURE — 2500000004 HC RX 250 GENERAL PHARMACY W/ HCPCS (ALT 636 FOR OP/ED): Performed by: STUDENT IN AN ORGANIZED HEALTH CARE EDUCATION/TRAINING PROGRAM

## 2024-04-26 PROCEDURE — 2500000006 HC RX 250 W HCPCS SELF ADMINISTERED DRUGS (ALT 637 FOR ALL PAYERS): Performed by: STUDENT IN AN ORGANIZED HEALTH CARE EDUCATION/TRAINING PROGRAM

## 2024-04-26 RX ORDER — LORAZEPAM 2 MG/ML
0.5 INJECTION INTRAMUSCULAR ONCE
Status: DISCONTINUED | OUTPATIENT
Start: 2024-04-26 | End: 2024-04-26

## 2024-04-26 RX ORDER — LORAZEPAM 0.5 MG/1
0.5 TABLET ORAL ONCE
Status: COMPLETED | OUTPATIENT
Start: 2024-04-26 | End: 2024-04-26

## 2024-04-26 RX ADMIN — IBUPROFEN 600 MG: 600 TABLET, FILM COATED ORAL at 09:00

## 2024-04-26 RX ADMIN — ACETAMINOPHEN 975 MG: 325 TABLET ORAL at 17:35

## 2024-04-26 RX ADMIN — ACETAMINOPHEN 975 MG: 325 TABLET ORAL at 08:59

## 2024-04-26 RX ADMIN — IBUPROFEN 600 MG: 600 TABLET, FILM COATED ORAL at 22:22

## 2024-04-26 RX ADMIN — LORAZEPAM 0.5 MG: 0.5 TABLET ORAL at 19:15

## 2024-04-26 RX ADMIN — ACETAMINOPHEN 975 MG: 325 TABLET ORAL at 22:22

## 2024-04-26 RX ADMIN — FLUOXETINE 80 MG: 20 CAPSULE ORAL at 08:59

## 2024-04-26 RX ADMIN — IBUPROFEN 600 MG: 600 TABLET, FILM COATED ORAL at 03:59

## 2024-04-26 RX ADMIN — IBUPROFEN 600 MG: 600 TABLET, FILM COATED ORAL at 17:35

## 2024-04-26 RX ADMIN — ENOXAPARIN SODIUM 80 MG: 80 INJECTION SUBCUTANEOUS at 04:00

## 2024-04-26 RX ADMIN — NIFEDIPINE 60 MG: 60 TABLET, FILM COATED, EXTENDED RELEASE ORAL at 06:37

## 2024-04-26 RX ADMIN — ACETAMINOPHEN 975 MG: 325 TABLET ORAL at 03:59

## 2024-04-26 ASSESSMENT — PAIN SCALES - GENERAL
PAINLEVEL_OUTOF10: 0 - NO PAIN
PAINLEVEL_OUTOF10: 4
PAINLEVEL_OUTOF10: 0 - NO PAIN
PAINLEVEL_OUTOF10: 0 - NO PAIN
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
## 2024-04-26 NOTE — PROGRESS NOTES
Postpartum Progress Note      Assessment/Plan     Cheyenne Wolf is a 34 y.o.,  initially presented for IOL in the s/o cHTN.   She had a Vaginal, Spontaneous   delivery on 2024  at 37w4d and is now postpartum day 1.    - cHTN  - BP low mild range to normotensive on Nifed 60mg   - has BP cuff @ home for monitoring BID  - HELLP labs in pregnancy WNL    #Postpartum  - continue routine postpartum care  - pain well controlled on po medications  - DVT Score: 5 ; SCDs and enoxaparin prophylactic dose daily while inpatient  - The patient's blood type is B POS.  Rhogam is not indicated.    #Maternal Well-Being  - emotional support provided  - bonding with infant @ bedside  - Contraception: Partner vasectomy and Condoms    # Feeding  - breastfeeding/pumping encouraged; lactation consult prn    #Dispo  - Anticipate DC PPD#3 pending BP control.  - The signs and symptoms of PEC were reviewed with the patient, including unrelenting headache, vision changes/blurred vision, and RUQ pain  - BP cuff for home for checking BP twice a day  - Pt instructed to call primary OB if SBP > 160 or DBP > 110 or if development of PEC symptoms   - On discharge, follow up with primary OB in:       - 2-5 days for BP check       - 4-6 week for post-partum visit       Principal Problem:    Encounter for induction of labor ([facility])  Active Problems:    Chronic hypertension affecting pregnancy ([facility])    GBS (group B Streptococcus carrier), +RV culture, currently pregnant ([facility])    PTSD (post-traumatic stress disorder)    Cleft lip and palate, fetal, 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     Meeting all postpartum milestones- ambulating independently, passing flatus, tolerating PO intake, lochia light, voiding spontaneously, and pain well controlled with  PO meds.    Objective   Physical Exam:  General: well appearing, well nourished, postpartum  Obstetric: fundus firm below umbilicus, lochia light  Skin: Warm, dry; no rashes/lesions/erythema  Breast: No masses, nipple discharge  Neuro: A/Ox3, no gross motor deficit   GI: no distension, appropriately tender, soft, +BS  Respiratory: Even and unlabored on RA  Cardiovascular: No BLE edema; No erythema, warmth  Psych: appropriate mood and affect, conversational    Last Vitals:  Temp Pulse Resp BP MAP Pulse Ox   36 °C (96.8 °F) 83 18 111/74   95 %       Vitals Min/Max Last 24 Hours:  Temp  Min: 35.9 °C (96.6 °F)  Max: 36.8 °C (98.2 °F)  Pulse  Min: 82  Max: 89  Resp  Min: 16  Max: 18  BP  Min: 108/73  Max: 138/76    Lab Data:  Lab Results   Component Value Date    WBC 10.5 04/24/2024    HGB 12.3 04/24/2024    HCT 37.3 04/24/2024     04/24/2024

## 2024-04-26 NOTE — LACTATION NOTE
This note was copied from a baby's chart.  Lactation Consultant Note  Lactation Consultation   Cindy Arzate RN IBCLC    Recommendations/Summary       I spoke with mom at pt's bedside.  Mom reports that the baby is latching and nursing well.  He has taken supplemental feeds by bottle as well.  She has no concerns about breastfeeding at this time.  Invited to contact LC services as needed.

## 2024-04-26 NOTE — ANESTHESIA POSTPROCEDURE EVALUATION
Cheyenne Wolf is a 34 y.o., , who had a Vaginal, Spontaneous  delivery on 2024  at 37w4d and is now POD1.    She had Neuraxial Anesthesia without immediate complications noted.       Pain well controlled    Vitals:    24 0417   BP: 108/73   Pulse: 82   Resp: 18   Temp: 36.1 °C (97 °F)   SpO2: 96%       Neuraxial site assessed. No visible redness or swelling or drainage. Patient able to ambulate and move all extremities without difficulty. Able to void. No complaints of nausea/vomiting. Tolerating PO intake well. No s/sx of PDPH.     Anesthesia will sign off     Ata James MD

## 2024-04-27 VITALS
OXYGEN SATURATION: 95 % | DIASTOLIC BLOOD PRESSURE: 84 MMHG | HEIGHT: 62 IN | SYSTOLIC BLOOD PRESSURE: 131 MMHG | HEART RATE: 81 BPM | TEMPERATURE: 97.5 F | RESPIRATION RATE: 16 BRPM | BODY MASS INDEX: 50.83 KG/M2 | WEIGHT: 276.24 LBS

## 2024-04-27 LAB
BLOOD EXPIRATION DATE: NORMAL
DISPENSE STATUS: NORMAL
PRODUCT BLOOD TYPE: 7300
PRODUCT CODE: NORMAL
UNIT ABO: NORMAL
UNIT NUMBER: NORMAL
UNIT RH: NORMAL
UNIT VOLUME: 350
XM INTEP: NORMAL

## 2024-04-27 PROCEDURE — 2500000006 HC RX 250 W HCPCS SELF ADMINISTERED DRUGS (ALT 637 FOR ALL PAYERS): Performed by: STUDENT IN AN ORGANIZED HEALTH CARE EDUCATION/TRAINING PROGRAM

## 2024-04-27 PROCEDURE — 2500000001 HC RX 250 WO HCPCS SELF ADMINISTERED DRUGS (ALT 637 FOR MEDICARE OP): Performed by: STUDENT IN AN ORGANIZED HEALTH CARE EDUCATION/TRAINING PROGRAM

## 2024-04-27 PROCEDURE — 2500000004 HC RX 250 GENERAL PHARMACY W/ HCPCS (ALT 636 FOR OP/ED): Performed by: STUDENT IN AN ORGANIZED HEALTH CARE EDUCATION/TRAINING PROGRAM

## 2024-04-27 PROCEDURE — 2500000001 HC RX 250 WO HCPCS SELF ADMINISTERED DRUGS (ALT 637 FOR MEDICARE OP): Performed by: NURSE PRACTITIONER

## 2024-04-27 RX ADMIN — IBUPROFEN 600 MG: 600 TABLET, FILM COATED ORAL at 18:33

## 2024-04-27 RX ADMIN — IBUPROFEN 600 MG: 600 TABLET, FILM COATED ORAL at 11:02

## 2024-04-27 RX ADMIN — FLUOXETINE 80 MG: 20 CAPSULE ORAL at 11:02

## 2024-04-27 RX ADMIN — ACETAMINOPHEN 975 MG: 325 TABLET ORAL at 18:33

## 2024-04-27 RX ADMIN — ACETAMINOPHEN 975 MG: 325 TABLET ORAL at 11:01

## 2024-04-27 RX ADMIN — ACETAMINOPHEN 975 MG: 325 TABLET ORAL at 04:01

## 2024-04-27 RX ADMIN — IBUPROFEN 600 MG: 600 TABLET, FILM COATED ORAL at 04:01

## 2024-04-27 RX ADMIN — ENOXAPARIN SODIUM 80 MG: 80 INJECTION SUBCUTANEOUS at 04:01

## 2024-04-27 RX ADMIN — NIFEDIPINE 60 MG: 60 TABLET, FILM COATED, EXTENDED RELEASE ORAL at 06:34

## 2024-04-27 ASSESSMENT — PAIN SCALES - GENERAL: PAINLEVEL_OUTOF10: 0 - NO PAIN

## 2024-04-27 NOTE — DISCHARGE SUMMARY
Discharge Summary    Admission Date: 2024  Discharge Date: 24  Discharge Diagnosis: Encounter for induction of labor ([facility])     Patient Active Problem List   Diagnosis    Chronic hypertension affecting pregnancy ([facility])    GBS (group B Streptococcus carrier), +RV culture, currently pregnant ([facility])    Obesity, Class III, BMI 40-49.9 (morbid obesity) ([facility])    PTSD (post-traumatic stress disorder)    History of pre-eclampsia in prior pregnancy, currently pregnant (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)    37 weeks gestation of pregnancy ([facility])    Abnormal glucose tolerance test in pregnancy ([facility])    Pregnancy affected by fetal growth restriction ([facility])    Encounter for induction of labor ([facility])       Hospital Course  Cheyenne Wolf is a 34 y.o.,     Initially presented for: IOL ISO cHTN    Admission Date: 2024    Delivery Date: 2024  2:56 AM     Delivery type: Vaginal, Spontaneous      GA at delivery: 37w4d    Outcome: Living     Anesthesia during delivery: Epidural     Intrapartum complications: None     Feeding method: Breastfeeding Status: Yes    Contraception: Partner vasectomy and Condoms    Rhogam: The patient's blood type is B POS.  Rhogam is not indicated.     Now postpartum day: 2.    Hospital course n/f:  - cHTN  - BP low mild range to normotensive on Nifed 60mg   - has BP cuff @ home for monitoring BID  - HELLP labs in pregnancy WNL  - discussed s/sx PEC    I have reviewed with the patient the standard 3 day stay for hypertensive disorders and the risks/benefits of early discharge, including death, stroke, seizure, and readmission. Pt has had 1 mild range BP in 48hrs. OK for DC today and close follow up.     PP course otherwise uneventful.  Meeting all postpartum milestones- ambulating independently, passing flatus, tolerating PO intake, lochia light, voiding spontaneously, and pain well controlled with PO  meds.     Dispo  OK for DC today per pt preference and using shared decision making  - The signs and symptoms of PEC were reviewed with the patient, including unrelenting headache, vision changes/blurred vision, and RUQ pain  - BP cuff for home for checking BP twice a day  - Pt instructed to call primary OB if SBP > 160 or DBP > 110 or if development of PEC symptoms   - On discharge, follow up with primary OB in:       - 2-5 days for BP check       - 4-6 week for post-partum visit     Pertinent Physical Exam At Time of Discharge  General: well appearing, well nourished, postpartum  Obstetric: fundus firm below umbilicus, lochia light  Skin: Warm, dry; no rashes/lesions/erythema  Breast: No masses, nipple discharge  Neuro: A/Ox3, conversational, no gross motor deficit   GI: no distension, appropriately tender, soft, +BS  Respiratory: Even and unlabored on RA, LSCTA BL  Cardiovascular: Trace BLE edema; No erythema, warmth  Psych: appropriate mood and affect       Your medication list        CONTINUE taking these medications        Instructions Last Dose Given Next Dose Due   acetaminophen 500 mg tablet  Commonly known as: Tylenol           FLUoxetine 40 mg capsule  Commonly known as: PROzac      Take 2 capsules (80 mg) by mouth once daily.       LORazepam 1 mg tablet  Commonly known as: Ativan      Take 1-2 tablets PO daily PRN anxiety       magnesium 250 mg tablet           NIFEdipine ER 60 mg 24 hr tablet  Commonly known as: Adalat CC      Take 1 tablet (60 mg) by mouth once daily in the morning. Take before meals. Do not crush, chew, or split.       PRENA1 CHEW ORAL           traZODone 50 mg tablet  Commonly known as: Desyrel      Take 1-2 tablets ( mg) by mouth once daily at bedtime.              STOP taking these medications      aspirin 81 mg chewable tablet                   Outpatient Follow-Up  Future Appointments   Date Time Provider Department Center   5/9/2024  2:00 PM JACKY Shaw  [id number] [facility] [place], APRN-CNP

## 2024-04-27 NOTE — CARE PLAN
Pt stable for discharge, VSS and meeting all postpartum milestones. Pain well controlled. All questions answered and discharge instructions completed.

## 2024-04-27 NOTE — PROGRESS NOTES
Postpartum Progress Note      Assessment/Plan     Cheyenne Wolf is a 34 y.o.,  initially presented for IOL in the s/o cHTN.   She had a Vaginal, Spontaneous   delivery on 2024  at 37w4d and is now postpartum day 2.    - cHTN  - BP low mild range to normotensive on Nifed 60mg   - has BP cuff @ home for monitoring BID  - HELLP labs in pregnancy WNL  - discussed s/sx PEC    #Postpartum  - continue routine postpartum care  - pain well controlled on po medications  - DVT Score: 5 ; SCDs and enoxaparin prophylactic dose daily while inpatient  - The patient's blood type is B POS.  Rhogam is not indicated.    #Maternal Well-Being  - emotional support provided  - bonding with infant @ bedside  - Contraception: Partner vasectomy and Condoms    # Feeding  - breastfeeding/pumping encouraged; lactation consult prn    #Dispo  - Anticipate DC PPD#3 pending BP control.  - The signs and symptoms of PEC were reviewed with the patient, including unrelenting headache, vision changes/blurred vision, and RUQ pain  - BP cuff for home for checking BP twice a day  - Pt instructed to call primary OB if SBP > 160 or DBP > 110 or if development of PEC symptoms   - On discharge, follow up with primary OB in:       - 2-5 days for BP check       - 4-6 week for post-partum visit       Principal Problem:    Encounter for induction of labor ([facility])  Active Problems:    Chronic hypertension affecting pregnancy ([facility])    GBS (group B Streptococcus carrier), +RV culture, currently pregnant ([facility])    PTSD (post-traumatic stress disorder)    Cleft lip and palate, fetal, 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     Meeting all postpartum milestones- ambulating independently, passing flatus, tolerating PO intake, lochia light, voiding spontaneously, and pain  well controlled with PO meds.    Objective   Physical Exam:  General: well appearing, well nourished, postpartum  Obstetric: fundus firm below umbilicus, lochia light  Skin: Warm, dry; no rashes/lesions/erythema  Breast: No masses, nipple discharge  Neuro: A/Ox3, no gross motor deficit   GI: no distension, appropriately tender, soft, +BS  Respiratory: Even and unlabored on RA  Cardiovascular: No BLE edema; No erythema, warmth  Psych: appropriate mood and affect, conversational    Last Vitals:  Temp Pulse Resp BP MAP Pulse Ox   36.4 °C (97.5 °F) 81 16 131/84   95 %       Vitals Min/Max Last 24 Hours:  Temp  Min: 35.9 °C (96.6 °F)  Max: 36.7 °C (98.1 °F)  Pulse  Min: 78  Max: 91  Resp  Min: 16  Max: 18  BP  Min: 111/74  Max: 131/84

## 2024-04-29 LAB
LABORATORY COMMENT REPORT: NORMAL
PATH REPORT.FINAL DX SPEC: NORMAL
PATH REPORT.GROSS SPEC: NORMAL
PATH REPORT.RELEVANT HX SPEC: NORMAL
PATH REPORT.TOTAL CANCER: NORMAL

## 2024-05-09 ENCOUNTER — APPOINTMENT (OUTPATIENT)
Dept: BEHAVIORAL HEALTH | Facility: CLINIC | Age: 34
End: 2024-05-09
Payer: COMMERCIAL

## 2024-05-11 DIAGNOSIS — F41.1 GAD (GENERALIZED ANXIETY DISORDER): ICD-10-CM

## 2024-05-11 DIAGNOSIS — F43.10 PTSD (POST-TRAUMATIC STRESS DISORDER): ICD-10-CM

## 2024-05-16 RX ORDER — LORAZEPAM 1 MG/1
TABLET ORAL
Qty: 20 TABLET | Refills: 1 | Status: SHIPPED | OUTPATIENT
Start: 2024-05-16

## 2024-05-20 ENCOUNTER — TELEMEDICINE (OUTPATIENT)
Dept: BEHAVIORAL HEALTH | Facility: CLINIC | Age: 34
End: 2024-05-20
Payer: COMMERCIAL

## 2024-05-20 DIAGNOSIS — F43.10 PTSD (POST-TRAUMATIC STRESS DISORDER): ICD-10-CM

## 2024-05-20 DIAGNOSIS — F41.1 GAD (GENERALIZED ANXIETY DISORDER): ICD-10-CM

## 2024-05-20 PROCEDURE — 99214 OFFICE O/P EST MOD 30 MIN: CPT | Performed by: STUDENT IN AN ORGANIZED HEALTH CARE EDUCATION/TRAINING PROGRAM

## 2024-05-20 PROCEDURE — 3008F BODY MASS INDEX DOCD: CPT | Performed by: STUDENT IN AN ORGANIZED HEALTH CARE EDUCATION/TRAINING PROGRAM

## 2024-05-20 RX ORDER — FLUOXETINE HYDROCHLORIDE 40 MG/1
80 CAPSULE ORAL DAILY
Qty: 60 CAPSULE | Refills: 1 | Status: SHIPPED | OUTPATIENT
Start: 2024-05-20 | End: 2024-07-19

## 2024-05-20 RX ORDER — TRAZODONE HYDROCHLORIDE 50 MG/1
50-100 TABLET ORAL NIGHTLY
Qty: 60 TABLET | Refills: 1 | Status: SHIPPED | OUTPATIENT
Start: 2024-05-20 | End: 2024-07-19

## 2024-05-20 NOTE — PROGRESS NOTES
"Subjective    All Individuals Present: Patient and Provider (Encounter Provider)     ID: Cheyenne Wolf is a 34 y.o. female with history of KENJI and PTSD presenting to  IOP.     Interval History/HPI/PFSH:   24, Manpreet, was a much better experience than last time. Doing mostly breastmilk via pumping    Surprised with how well she is doing. Has felt more emotional at times, waves of random sadness, mostly in first few weeks. Feels good besides those moments. Even went out this past weekend.    Baby has been easy, sleeping well.    Trazodone working well, sleeping restfully.    Still on nifedipine, no issues with BP thus far.    Denies SI, HI, AVH.     Medication side effects: None Reported     Review of Systems  Constitutional: Negative  Psychiatric: Positive for anxiety, fatigue, and phobia , Negative for depression, irritability, mood swings, and sleep disturbance  Neurological: Negative   Other: 3 weeks PP    Objective   LMP 2023 (Exact Date)   Wt Readings from Last 4 Encounters:   24 121 kg (267 lb 11.2 oz)   24 123 kg (271 lb)   23 122 kg (270 lb)   10/25/23 124 kg (274 lb)     Mental Status Exam  General Appearance: Well groomed, appropriate eye contact.  Attitude/Behavior: Cooperative, conversant, engaged, and with good eye contact.  Motor: No psychomotor agitation or retardation, no tremor or other abnormal movements.  Speech: Normal rate, volume, prosody  Gait/Station: Within normal limits  Mood: \"good\"  Affect: Euthymic, full-range and Congruent with mood and topic of conversation  Thought Process: Linear, goal directed  Thought Associations: No loosening of associations  Thought Content: normal  Sensorium: Alert and oriented to person, place, time and situation  Insight: Intact, as evidenced by awareness of symptom patterns, introspection  Judgment: Intact  Cognition: Cognitively intact to conversational testing with respect to attention, orientation, fund of " knowledge, recent and remote memory, and language.  Testing: N/A.    Laboratory/Imaging/Diagnostic Tests       Assessment/Plan   Overall Formulation and Differential Diagnosis:  Cheyenne Wolf is a 34 y.o. female who meets criteria for KENJI and PTSD.  Interval Assessment:   @3 weeks PP presenting to IOP d/t worsening anxiety and hypervigilance in pregnancy. Had previously been on fluoxetine to fair effect after traumatic birth experience with first child. Was switched to sertraline with current pregnancy by outside provider, has noted worsening anxiety and no purported benefit from such. Will try transitioning back to fluoxetine given prior efficacy. Discussed limited data regarding fluoxetine, and discussed risks, benefits, and alternatives in context of pregnancy and postpartum, and she was agreeable.    *24: 3 weeks PP, mood remains stable. Mild baby blues, no PTSD exacerbation. Will continue meds as they are.    Plan:  -continue fluoxetine 80 mg daily  -continue lorazepam to 1 mg PO daily PRN  -continue trazodone  mg PO at bedtime PRN insomnia  -RTC 4 weeks    Risk Assessment:  Imminent Risk of Suicide or Serious Self-Injury: Low Risk -- Risk factors include: History of trauma or abuse  Protective factors include:Denies current suicidal ideation, Denies history of suicide attempts , Future-oriented talk , Willingness to seek help and support , Skills in problem solving, conflict resolution, and nonviolent handling of disputes, Cultural and Lutheran beliefs that discourage suicide and support self-preservation , Access to a variety of clinical interventions , Receiving and engaged in care for mental, physical, and substance use disorders , History of adhering to treatment recommendations and/or prescribed medication regimen , Support through ongoing medical and mental healthcare relationships , Current/history of good response to treatment/meds , Interpersonal relationships and supports, e.g.,  family, friends, peers, community , and Restricted access to firearms or other lethal means of suicide   Imminent Risk of Violence or Homicide: Low Risk - Risk factors include: No significant risk factors identified on screening. Protective factors include: Lack of known history of harm to others , Lack of known history of violent ideation , Lack of known access to firearms , Sense of community, availability/access to resources and support , Sense of optimism, hope , Interpersonal competence , Affect regulation , Sense of self-efficacy, internal locus of control , and Positive, pro-social family/peer network   Treatment Plan:  There are no recently modified care plans to display for this patient.      Attestation Statements   Portion Spent on Counseling & Coordination of Care: N/A - E&M coding by complexity of care.

## 2024-05-24 ENCOUNTER — APPOINTMENT (OUTPATIENT)
Dept: BEHAVIORAL HEALTH | Facility: CLINIC | Age: 34
End: 2024-05-24
Payer: COMMERCIAL

## 2024-05-31 ENCOUNTER — SOCIAL WORK (OUTPATIENT)
Dept: BEHAVIORAL HEALTH | Facility: CLINIC | Age: 34
End: 2024-05-31
Payer: COMMERCIAL

## 2024-05-31 DIAGNOSIS — F43.10 PTSD (POST-TRAUMATIC STRESS DISORDER): ICD-10-CM

## 2024-05-31 DIAGNOSIS — F41.1 GAD (GENERALIZED ANXIETY DISORDER): ICD-10-CM

## 2024-05-31 PROCEDURE — 90837 PSYTX W PT 60 MINUTES: CPT | Mod: AJ,95 | Performed by: SOCIAL WORKER

## 2024-05-31 NOTE — PROGRESS NOTES
This session was conducted via HIPAA compliant video. Pt was provided with informed consent.    INDIVIDUAL PSYCHOTHERAPY    Time: 10:01-10:57am    Mental Status: Alert & Oriented x3    Diagnosis/problems: PTSD, KENJI    Method of therapy: Trauma-informed, supportive, treatment planning/goal setting    Patient Location: Pt joined the session virtually from her home    Symptoms: Endorses anxiety and avoidance related to recent stressful events (son's NICU stay.) Maladaptive/negative core beliefs related to childhood trauma. Past- Flashbacks, panic attacks, in response to trauma reminders.     Functional status: Pt is stable, functioning well currently.     Treatment plan: Pt and provider agreed to utilize EMDR to desensitize and reprocess stressful memories related to NICU/postpartum experience as well as certain memories related to childhood trauma and related negative core beliefs.    Treatment modality/frequency: EMDR and trauma-informed therapy, weekly (next session in 2 weeks due to provider out of office.)    Progress since last encounter: Significant     MANAN Bragg

## 2024-06-14 ENCOUNTER — APPOINTMENT (OUTPATIENT)
Dept: BEHAVIORAL HEALTH | Facility: CLINIC | Age: 34
End: 2024-06-14
Payer: COMMERCIAL

## 2024-06-17 ENCOUNTER — APPOINTMENT (OUTPATIENT)
Dept: OBSTETRICS AND GYNECOLOGY | Facility: CLINIC | Age: 34
End: 2024-06-17
Payer: COMMERCIAL

## 2024-06-20 RX ORDER — FLUOXETINE HYDROCHLORIDE 40 MG/1
80 CAPSULE ORAL DAILY
Qty: 60 CAPSULE | Refills: 1 | Status: SHIPPED | OUTPATIENT
Start: 2024-06-20 | End: 2024-08-19

## 2024-06-24 ENCOUNTER — APPOINTMENT (OUTPATIENT)
Dept: OBSTETRICS AND GYNECOLOGY | Facility: CLINIC | Age: 34
End: 2024-06-24
Payer: COMMERCIAL

## 2024-06-28 ENCOUNTER — APPOINTMENT (OUTPATIENT)
Dept: BEHAVIORAL HEALTH | Facility: CLINIC | Age: 34
End: 2024-06-28
Payer: COMMERCIAL

## 2024-07-23 ENCOUNTER — APPOINTMENT (OUTPATIENT)
Dept: OBSTETRICS AND GYNECOLOGY | Facility: CLINIC | Age: 34
End: 2024-07-23
Payer: COMMERCIAL

## 2024-08-19 DIAGNOSIS — F41.1 GAD (GENERALIZED ANXIETY DISORDER): ICD-10-CM

## 2024-08-19 DIAGNOSIS — F43.10 PTSD (POST-TRAUMATIC STRESS DISORDER): ICD-10-CM

## 2024-09-04 RX ORDER — LORAZEPAM 1 MG/1
TABLET ORAL
Qty: 20 TABLET | Refills: 1 | Status: SHIPPED | OUTPATIENT
Start: 2024-09-04

## 2024-09-18 DIAGNOSIS — F43.10 PTSD (POST-TRAUMATIC STRESS DISORDER): ICD-10-CM

## 2024-09-18 DIAGNOSIS — F41.1 GAD (GENERALIZED ANXIETY DISORDER): ICD-10-CM

## 2024-09-18 RX ORDER — TRAZODONE HYDROCHLORIDE 50 MG/1
50-100 TABLET ORAL NIGHTLY
Qty: 60 TABLET | Refills: 1 | Status: SHIPPED | OUTPATIENT
Start: 2024-09-18

## 2024-10-02 DIAGNOSIS — F43.10 PTSD (POST-TRAUMATIC STRESS DISORDER): ICD-10-CM

## 2024-10-02 DIAGNOSIS — F41.1 GAD (GENERALIZED ANXIETY DISORDER): ICD-10-CM

## 2024-10-02 RX ORDER — FLUOXETINE HYDROCHLORIDE 40 MG/1
80 CAPSULE ORAL DAILY
Qty: 60 CAPSULE | Refills: 11 | Status: SHIPPED | OUTPATIENT
Start: 2024-10-02 | End: 2025-10-02

## 2024-10-20 ENCOUNTER — TELEPHONE (OUTPATIENT)
Dept: PSYCHIATRY | Facility: HOSPITAL | Age: 34
End: 2024-10-20
Payer: COMMERCIAL

## 2024-10-20 NOTE — PROGRESS NOTES
Pt called answering service at 6:30 this morning for someone to talk to. Patient reports that she is having a hard time recently, she is 5 months post-partum.   Main concern  is occasional alcohol use that leads to overwhelming guilt and shame the next day, last occurrence was this Friday, and has been occurring about once a week for the past month. Patient denies current desire to drink alcohol, denies everyday use.       Stated she had a breakdown on Friday, abused alcohol and stayed up all night drinking. 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     Reports fatigue, denies manic symptoms of grandiosity, staying up multiple nights, or increased energy. Patient denies  any suicidal thoughts today or wanting to hurt her self, denies wanting to hurt others, denies any intrusive thoughts of wanting to harm her baby. States she needs someone to talk to.      called her sister yesterday about patient drinking and now patient is struggling with guilt and shame that this happened this morning.     Prozac currently, doesnt feel like it is helping this week due to lots going on but has otherwise been helping. Started vomiting from being overwhelmed during phone conversation,   came onto phone.  reports cycle of stressors adding up, then Friday night uses alcohol as tool, then on Saturday has overwhelming guilt. Not every day drinking.  not concerned for SI or HI, gun present in home but locked up.     IOP done in past by patient but fell off of doing it after baby, has been choatic,at home. Reports increased fatigue for last week, staying up all night. No bipolar disorder history. Feels ashamed that not a good mom, good wife.     Reports alcohol use 1/week for past month, denies everyday alcohol use. Talked about this being a coping mechanism. Patient wants to be seen soon by providers. If possible, pt would benefit from being seen by Dr. Lopez  or Shilpa Cotto if there is any open appointments coming up     Ashley Price, DO  PGY 1

## 2024-10-21 ENCOUNTER — TELEPHONE (OUTPATIENT)
Dept: OTHER | Age: 34
End: 2024-10-21
Payer: COMMERCIAL

## 2024-10-21 NOTE — TELEPHONE ENCOUNTER
Experiencing post partum, which seems to be worsening for the last 30 days.    She would like you to call her  to discuss possibly re entering IOP

## 2024-10-22 ENCOUNTER — SOCIAL WORK (OUTPATIENT)
Dept: BEHAVIORAL HEALTH | Facility: CLINIC | Age: 34
End: 2024-10-22
Payer: COMMERCIAL

## 2024-10-22 DIAGNOSIS — F10.20 MODERATE ALCOHOL USE DISORDER (MULTI): ICD-10-CM

## 2024-10-22 DIAGNOSIS — F33.1 MAJOR DEPRESSIVE DISORDER, RECURRENT EPISODE, MODERATE: ICD-10-CM

## 2024-10-22 PROCEDURE — 90837 PSYTX W PT 60 MINUTES: CPT | Mod: AJ,95 | Performed by: SOCIAL WORKER

## 2024-10-22 NOTE — PROGRESS NOTES
Individual Psychotherapy    Time: Start time 3:00pm; end time: 4:00pm  Patient Location: Home - session conducted via HIPAA compliant online platform. Pt provided with informed consent.   Clinician Location: Home  Chief complaint: Alcohol abuse, postpartum depression and anxiety  Symptoms:   Evaluation Procedures:   [x]Interview with   []Review of records  []Psychological testing:                                     Background Information     Medical History:   []see medical chart for details  []addiction  []cardiac illness  [x]hypertension    []diabetes  []sleep disorder  []fertility issues    []per patient history is significant for chronic pain  []nutrition/obesity/eating disorder  []other      Additional Comments:               Current Functioning     Orientation: Pt alert and oriented x3  Appearance/Personal Hygiene: WNL  Eye Contact: Average  Psychosis:   Hallucinations: [x]None   []Auditory   []visual   []olfactory   []gustatory  Delusions: []Bizarre []Grandiose []Jealousy []Nihilistic []Persecutory []Reference []Somatic     Homicidal Ideation/Intentions:   [x]None  []Yes  []Duty to Protect process completed                                          Insight: Good  Intelligence: WNL  Memory/Cognition: WNL     Mood/Affect:   []Angry  [x]Anxious  []Appropriate  []Bright   [x]Distressed  []Fatigued  []Flat    [x]Expressing Guilt  []Hopeful  []Being Irritable  [x]Labile  []Expressing Loss of Pleasure  [x]Being Sad     []Suspicious  [x]Tearful  []Having Trouble Concentrating  []Withdrawn  []Expressing Worthlessness  []Expressing Worry  []Difficult or Unable to Assess     Suicidal Ideation/Intentions:   []None (patient denies current Suicidal Ideation)  [x]Yes (patient endorses some current suicidal ideation)  If yes, complete the following assessment:  Frequency of occurrence: A few times in the last week  How long does it last: fleeting/minutes  Intensity of suicidal thoughts: Pt endorses passive death wish (that  her children deserve a better mother and would be better if she wasn't here anymore). Pt denies thoughts about method, planning, denies intent.   Reasons individual would rather die than live: Feelings of guilt and shame  Detailed Plan: Denies  Plan location:   How lethal is the method:   Access to lethal methods:   If firearms, are they being removed from patient access:   Steps taken to enact plan: Denies  Rehearsal behaviors:   Obtained access:   Details:   Thought Process:  []Blocking  []Circumstantial  []Clang Associations  []Coherent  []Egocentric    []Evasive  []Flight of ideas  []Incoherent [x]Logical  []Loose Associations  []Magical thinking    []Neologisms  []Perseveration  []Rational  []Tangential  []Word Salad           Test Results and Interpretation:   (add as needed)                                      None           Problem List:   []Lipids  []Heart disease  []Obesity  []Prior TIA / stroke  []Hypertension    []Diabetes mellitus  []Hyperlipidemia  []Seizure disorder  []Sedentary lifestyle  []Gastrointestinal problem [x]Mood disorder  []Personality disorder  []Thought disorder  [] Dementia       []Learning problems  []Cognitive  impairment  []Compliance difficulties   [x]Social isolation      Additional Comments:               Clinical Impressions:  Postpartum depression  Alcohol use disorder     Treatment Plan/Recommendations:  Pt will re-engage with this provider for weekly psychotherapy (solution-focused, trauma-informed.)  Pt will attend [facility] 6 week virtual support group for postpartum depression support.  Pt agreed to attend at least one AA meeting for support staying abstinent from alcohol.  Pt will actively engage her support system for help with childcare as well as emotional support.   Pt will reconnect with prescriber Dr. Christian Lopez at  for medication management.    Follow up 1 week.    JACKY Shaw

## 2024-10-25 ENCOUNTER — CLINICAL SUPPORT (OUTPATIENT)
Dept: BEHAVIORAL HEALTH | Facility: CLINIC | Age: 34
End: 2024-10-25
Payer: COMMERCIAL

## 2024-10-25 ENCOUNTER — TELEMEDICINE (OUTPATIENT)
Dept: BEHAVIORAL HEALTH | Facility: CLINIC | Age: 34
End: 2024-10-25
Payer: COMMERCIAL

## 2024-10-25 DIAGNOSIS — F41.1 GAD (GENERALIZED ANXIETY DISORDER): ICD-10-CM

## 2024-10-25 DIAGNOSIS — F33.1 MAJOR DEPRESSIVE DISORDER, RECURRENT EPISODE, MODERATE: ICD-10-CM

## 2024-10-25 DIAGNOSIS — F43.10 PTSD (POST-TRAUMATIC STRESS DISORDER): ICD-10-CM

## 2024-10-25 DIAGNOSIS — F10.90 ALCOHOL USE DISORDER: ICD-10-CM

## 2024-10-25 PROCEDURE — 90853 GROUP PSYCHOTHERAPY: CPT | Mod: AJ,95 | Performed by: SOCIAL WORKER

## 2024-10-25 PROCEDURE — 99214 OFFICE O/P EST MOD 30 MIN: CPT | Performed by: STUDENT IN AN ORGANIZED HEALTH CARE EDUCATION/TRAINING PROGRAM

## 2024-10-25 RX ORDER — NALTREXONE HYDROCHLORIDE 50 MG/1
50 TABLET, FILM COATED ORAL DAILY
Qty: 30 TABLET | Refills: 11 | OUTPATIENT
Start: 2024-10-25 | End: 2025-10-25

## 2024-10-25 RX ORDER — GABAPENTIN 300 MG/1
300 CAPSULE ORAL 3 TIMES DAILY
Qty: 90 CAPSULE | Refills: 1 | OUTPATIENT
Start: 2024-10-25 | End: 2024-12-24

## 2024-10-25 RX ORDER — TRAZODONE HYDROCHLORIDE 100 MG/1
100-200 TABLET ORAL NIGHTLY
Qty: 60 TABLET | Refills: 1 | OUTPATIENT
Start: 2024-10-25 | End: 2024-12-24

## 2024-10-25 NOTE — PROGRESS NOTES
Subjective    All Individuals Present: Patient and Provider (Encounter Provider)     ID: Cheyenne Wolf is a 34 y.o. female with history of KENJI and PTSD presenting for FUV.     Interval History/HPI/PFSH:   24, Manpreet.    Had been struggling for a few months with limited support.    Had been binge-drinking, feels ashamed, guilty, made it difficult to reach out and get help. Thought she should be able to handle all the stress without anyone else helping.    Denies SI, HI, AVH.     Medication side effects: None Reported     Review of Systems  Constitutional: Negative  Psychiatric: Positive for anxiety, depression, excessive alcohol consumption, fatigue, loss of interest in favorite activities, and phobia , Negative for irritability, mood swings, and sleep disturbance  Neurological: Negative   Other: 6 months PP    Objective   LMP 2023 (Exact Date)   Wt Readings from Last 4 Encounters:   24 121 kg (267 lb 11.2 oz)   24 123 kg (271 lb)   23 122 kg (270 lb)   10/25/23 124 kg (274 lb)     Mental Status Exam  General Appearance: Well groomed, appropriate eye contact.  Attitude/Behavior: Cooperative, conversant, engaged, and with good eye contact.  Motor: No psychomotor agitation or retardation, no tremor or other abnormal movements.  Speech: Normal rate, volume, prosody  Gait/Station: Within normal limits  Mood: depressed, anxious  Affect: Dysphoric, constricted , Sad/tearful, Anxious, and Congruent with mood and topic of conversation  Thought Process: Linear, goal directed  Thought Associations: No loosening of associations  Thought Content: normal  Sensorium: Alert and oriented to person, place, time and situation  Insight: Intact, as evidenced by awareness of symptom patterns, introspection  Judgment:  Limited re: recent alcohol abuse  Cognition: Cognitively intact to conversational testing with respect to attention, orientation, fund of knowledge, recent and remote memory, and  language.  Testing: N/A.    Laboratory/Imaging/Diagnostic Tests       Assessment/Plan   Overall Formulation and Differential Diagnosis:  Cheyenne Wolf is a 34 y.o. female who meets criteria for KENJI and PTSD.  Interval Assessment:   @6 months PP presenting to IOP d/t worsening anxiety and hypervigilance in pregnancy. Had previously been on fluoxetine to fair effect after traumatic birth experience with first child. Was switched to sertraline with current pregnancy by outside provider, has noted worsening anxiety and no purported benefit from such. Will try transitioning back to fluoxetine given prior efficacy. Discussed limited data regarding fluoxetine, and discussed risks, benefits, and alternatives in context of pregnancy and postpartum, and she was agreeable.    *24: Exacerbation of depression past few months with limited psychosocial support, has recently been abusing alcohol to cope and now trying to re-engage with treatment. Will start naltrexone to help with alcohol cravings and continue working on mood.    Plan:  -continue fluoxetine 80 mg daily  -discontinue lorazepam  -start naltrexone 50 mg PO daily  -continue trazodone  mg PO at bedtime PRN insomnia  -RTC 2 weeks    Risk Assessment:  Imminent Risk of Suicide or Serious Self-Injury: Low Risk -- Risk factors include: History of trauma or abuse  Protective factors include:Denies current suicidal ideation, Denies history of suicide attempts , Future-oriented talk , Willingness to seek help and support , Skills in problem solving, conflict resolution, and nonviolent handling of disputes, Cultural and [employer] beliefs that discourage suicide and support self-preservation , Access to a variety of clinical interventions , Receiving and engaged in care for mental, physical, and substance use disorders , History of adhering to treatment recommendations and/or prescribed medication regimen , Support through ongoing medical and mental healthcare  relationships , Current/history of good response to treatment/meds , Interpersonal relationships and supports, e.g., family, friends, peers, community , and Restricted access to firearms or other lethal means of suicide   Imminent Risk of Violence or Homicide: Low Risk - Risk factors include: No significant risk factors identified on screening. Protective factors include: Lack of known history of harm to others , Lack of known history of violent ideation , Lack of known access to firearms , Sense of community, availability/access to resources and support , Sense of optimism, hope , Interpersonal competence , Affect regulation , Sense of self-efficacy, internal locus of control , and Positive, pro-social family/peer network   Treatment Plan:  There are no recently modified care plans to display for this patient.      Attestation Statements   Portion Spent on Counseling & Coordination of Care: N/A - E&M coding by complexity of care.

## 2024-10-26 ENCOUNTER — PATIENT MESSAGE (OUTPATIENT)
Dept: BEHAVIORAL HEALTH | Facility: CLINIC | Age: 34
End: 2024-10-26
Payer: COMMERCIAL

## 2024-10-26 DIAGNOSIS — F43.10 PTSD (POST-TRAUMATIC STRESS DISORDER): ICD-10-CM

## 2024-10-30 ENCOUNTER — SOCIAL WORK (OUTPATIENT)
Dept: BEHAVIORAL HEALTH | Facility: CLINIC | Age: 34
End: 2024-10-30
Payer: COMMERCIAL

## 2024-10-30 ENCOUNTER — TELEPHONE (OUTPATIENT)
Dept: BEHAVIORAL HEALTH | Facility: CLINIC | Age: 34
End: 2024-10-30

## 2024-10-30 DIAGNOSIS — F43.10 PTSD (POST-TRAUMATIC STRESS DISORDER): ICD-10-CM

## 2024-10-30 DIAGNOSIS — F33.1 MAJOR DEPRESSIVE DISORDER, RECURRENT EPISODE, MODERATE: ICD-10-CM

## 2024-10-30 DIAGNOSIS — F10.20 MODERATE ALCOHOL USE DISORDER (MULTI): ICD-10-CM

## 2024-10-30 PROCEDURE — 90834 PSYTX W PT 45 MINUTES: CPT | Mod: AJ,95 | Performed by: SOCIAL WORKER

## 2024-10-30 RX ORDER — NALTREXONE HYDROCHLORIDE 50 MG/1
50 TABLET, FILM COATED ORAL DAILY
Qty: 30 TABLET | Refills: 0 | Status: SHIPPED | OUTPATIENT
Start: 2024-10-30 | End: 2025-10-30

## 2024-11-01 ENCOUNTER — CLINICAL SUPPORT (OUTPATIENT)
Dept: BEHAVIORAL HEALTH | Facility: CLINIC | Age: 34
End: 2024-11-01
Payer: COMMERCIAL

## 2024-11-01 DIAGNOSIS — F33.1 MODERATE EPISODE OF RECURRENT MAJOR DEPRESSIVE DISORDER: ICD-10-CM

## 2024-11-01 PROCEDURE — 90853 GROUP PSYCHOTHERAPY: CPT | Mod: AJ

## 2024-11-06 ENCOUNTER — SOCIAL WORK (OUTPATIENT)
Dept: BEHAVIORAL HEALTH | Facility: CLINIC | Age: 34
End: 2024-11-06
Payer: COMMERCIAL

## 2024-11-06 DIAGNOSIS — F43.10 PTSD (POST-TRAUMATIC STRESS DISORDER): ICD-10-CM

## 2024-11-06 DIAGNOSIS — F41.1 GAD (GENERALIZED ANXIETY DISORDER): ICD-10-CM

## 2024-11-06 DIAGNOSIS — F10.90 ALCOHOL USE DISORDER: ICD-10-CM

## 2024-11-06 PROCEDURE — 90837 PSYTX W PT 60 MINUTES: CPT | Mod: AJ,95 | Performed by: SOCIAL WORKER

## 2024-11-06 NOTE — PROGRESS NOTES
This session was conducted via HIPAA compliant video. Pt was provided with informed consent.    INDIVIDUAL PSYCHOTHERAPY    Time: 1:05-2:00pm    Mental Status: Alert & Oriented x3    Diagnosis/problems: PTSD, KENJI, alcohol use disorder    Method of therapy: Supportive, solution-focused    Patient Location: Pt joined the session virtually from her home (therapist joined from home)    Symptoms: Pt endorses persistent feelings of guilt and shame. Difficulty controlling worry and negative self-talk.     Functional status: Pt is moderately impaired in home life due to emotion dysregulation, anxiety.    Treatment plan: Pt will continue to abstain from alcohol and attend AA meetings multiple times a week. Pt to practice assertive communication, boundary setting- will support pt with boundary setting work in session and encouraging pt to ask for help. Explore possibility of EMDR for help resolving feelings of shame surrounding alcohol use.    Treatment modality/frequency: Supportive, trauma-informed- weekly    Progress since last encounter: Significant    MANAN Bragg

## 2024-11-08 ENCOUNTER — APPOINTMENT (OUTPATIENT)
Dept: BEHAVIORAL HEALTH | Facility: CLINIC | Age: 34
End: 2024-11-08
Payer: COMMERCIAL

## 2024-11-13 ENCOUNTER — APPOINTMENT (OUTPATIENT)
Dept: BEHAVIORAL HEALTH | Facility: CLINIC | Age: 34
End: 2024-11-13
Payer: COMMERCIAL

## 2024-11-15 ENCOUNTER — APPOINTMENT (OUTPATIENT)
Dept: BEHAVIORAL HEALTH | Facility: CLINIC | Age: 34
End: 2024-11-15
Payer: COMMERCIAL

## 2024-11-18 ENCOUNTER — TELEMEDICINE (OUTPATIENT)
Dept: BEHAVIORAL HEALTH | Facility: CLINIC | Age: 34
End: 2024-11-18
Payer: COMMERCIAL

## 2024-11-18 DIAGNOSIS — F41.1 GAD (GENERALIZED ANXIETY DISORDER): ICD-10-CM

## 2024-11-18 DIAGNOSIS — F10.90 ALCOHOL USE DISORDER: ICD-10-CM

## 2024-11-18 DIAGNOSIS — F33.1 MODERATE EPISODE OF RECURRENT MAJOR DEPRESSIVE DISORDER: ICD-10-CM

## 2024-11-18 DIAGNOSIS — F43.10 PTSD (POST-TRAUMATIC STRESS DISORDER): ICD-10-CM

## 2024-11-18 PROCEDURE — 99214 OFFICE O/P EST MOD 30 MIN: CPT | Performed by: STUDENT IN AN ORGANIZED HEALTH CARE EDUCATION/TRAINING PROGRAM

## 2024-11-18 RX ORDER — BUSPIRONE HYDROCHLORIDE 10 MG/1
10 TABLET ORAL 3 TIMES DAILY
Qty: 90 TABLET | Refills: 1 | Status: SHIPPED | OUTPATIENT
Start: 2024-11-18 | End: 2025-01-17

## 2024-11-18 NOTE — PROGRESS NOTES
OUTPATIENT PSYCHIATRY - IOP follow up    Visit type: A telephone visit (audio only) between the patient (at the originating site) and the provider (at the distant site) was utilized to provide this telehealth service.     Verbal consent was requested and obtained from Cheyenne Wolf on this date, 11/18/24 for a telehealth visit.     Subjective     HISTORY OF PRESENT ILLNESS  Cheyenne Wolf is a 34 y.o. female with a psychiatric history of KENJI, PTSD and alcohol use disorder (currently in remission) who presented to follow up for her IOP visit.    The patient reports feeling overall well. She is compliant with her psychotropic medications, including fluoxetine 80 mg daily, and uses lorazepam PRN, although she has not needed it recently. She is currently sober from alcohol and feels much better as a result. However, she reports experiencing some ongoing anxiety and inquires about the possibility of trying an anti-anxiety medication. She mentions having discussed this previously but does not recall the specific medication discussed. The patient discontinued naltrexone due to side effects, including nausea, headaches, and drowsiness, which she was unable to tolerate.    She shares that she and her family recently returned from a [employer] cruise, which was a wonderful experience, and she had a great time. She reports having good sleep and appetite, and denies any symptoms of depression, anusha, panic, delusions, paranoia, or hallucinations. She also denies any thoughts, plans, or intent to harm herself or others. The patient plans to continue with her monthly follow-up appointments and is currently attending weekly therapy and a moms’ group, both of which she finds helpful.    INTERVAL HISTORY  Current psychiatric medications: fluoxetine 80mg, lorazepam 1mg PRN, trazodone 50-100mg PO PRN for insomnia  Current psychiatric treatment: IOP  Side effects: none  Recent stressors: none  Interval substance use:  alcohol use  "disorder in remission    SAFETY  Suicidal ideations: Denies  Violent/homicidal ideations: denies  Self-injurious behaviors: denies    MENTAL STATUS EXAMINATION:  General Appearance: Well groomed, appropriate eye contact.  Speech: Normal rate, volume, prosody  Mood: \"good\"  Affect: Euthymic, full-range  Thought Process: Linear, goal directed  Thought Associations: No loosening of associations  Thought Content: normal  Perception: No perceptual abnormalities noted  Level of Consciousness: Alert  Orientation: Alert and oriented to person, place, time and situation  Attention and Concentration: Intact  Recent Memory: Intact as evidenced by ability to recall details from the past 24 hours   Remote Memory: Intact as evidenced by ability to recall previous medical issues   Executive function: intact  Fund of knowledge: Good  Insight: intact  Judgment: intact    ASSESSMENT/PLAN:  --Cheyenne Wolf is a 34 y.o.  female presents for her f/up visit. The patient reports that she is doing well overall. She is compliant with fluoxetine 80 mg and is requesting a trial of another medication to help manage her anxiety. She remains sober from alcohol and finds her sobriety to be very beneficial. Her mood is generally stable, and she continues to attend weekly therapy sessions and group meetings. She discontinued naltrexone on her own due to an inability to tolerate the medication, experiencing side effects such as nausea and drowsiness.    At home, things are going well, and she enjoys spending time with her children. She reports having overall good sleep and denies any suicidal ideation (SI), homicidal ideation (HI), or auditory/visual hallucinations (AVH). There are no immediate acute concerns at this time. We discussed adding Buspirone 10 mg three times daily (TID) to her medication regimen for anxiety, and the patient was receptive to this plan. The benefits and potential side effects of the medication were reviewed with " her.    Diagnosis:  --Generalized anxiety disorder  --PTSD  --Alcohol use disorder, in remission  --Insomnia, unspecified    Plan:  --Continue fluoxetine 80 mg daily  --Continue lorazepam to 1 mg PO daily PRN, rescue medication only for breakthrough anxiety or panic  --Continue trazodone  mg PO at bedtime PRN insomnia  --Start Buspirone 10mg TID for anxiety    --RTC 4 weeks        I saw and evaluated the patient. I personally obtained the key and critical portions of the history and physical exam or was physically present for key and critical portions performed by the resident/fellow. I reviewed the resident/fellow's documentation and discussed the patient with the resident/fellow. I agree with the resident/fellow's medical decision making as documented in the note.    Christian Lopez MD

## 2024-11-20 ENCOUNTER — SOCIAL WORK (OUTPATIENT)
Dept: BEHAVIORAL HEALTH | Facility: CLINIC | Age: 34
End: 2024-11-20
Payer: COMMERCIAL

## 2024-11-20 DIAGNOSIS — F41.1 GAD (GENERALIZED ANXIETY DISORDER): ICD-10-CM

## 2024-11-20 DIAGNOSIS — F43.10 PTSD (POST-TRAUMATIC STRESS DISORDER): ICD-10-CM

## 2024-11-20 DIAGNOSIS — F10.21 ALCOHOL USE DISORDER, MODERATE, IN EARLY REMISSION (MULTI): ICD-10-CM

## 2024-11-20 PROCEDURE — 90834 PSYTX W PT 45 MINUTES: CPT | Mod: AJ,95 | Performed by: SOCIAL WORKER

## 2024-11-20 NOTE — PROGRESS NOTES
This session was conducted via HIPAA compliant video. Pt was provided with informed consent.    INDIVIDUAL PSYCHOTHERAPY    Time: 1:05-1:56pm    Mental Status: Alert & Oriented x3    Diagnosis/problems: PTSD, KENJI, alcohol use disorder    Method of therapy: Supportive, solution-focused    Patient Location: Pt joined the session virtually from her home (therapist joined from home)    Symptoms: Pt endorses persistent feelings of guilt and shame. Difficulty controlling worry and negative self-talk.     Functional status: Pt is mildly impaired in home life due to emotion dysregulation, anxiety.    Treatment plan: Pt will continue to abstain from alcohol and attend AA meetings multiple times a week. Pt to practice assertive communication, boundary setting- will support pt with boundary setting work in session and encouraging pt to ask for help. Explore possibility of EMDR for help resolving feelings of shame surrounding alcohol use.    Treatment modality/frequency: Supportive, trauma-informed- weekly. Pt informed that provider is going on leave in month of December 2024. Pt will continue with  moms matter weekly support group during that time. Pt would like to continue with this provider in January 2025 for weekly therapy.  Pt endorses overall stable mood, continuing to abstain from alcohol, increased boundary setting.     Progress since last encounter: Significant    MANAN Bragg

## 2024-11-22 ENCOUNTER — CLINICAL SUPPORT (OUTPATIENT)
Dept: BEHAVIORAL HEALTH | Facility: CLINIC | Age: 34
End: 2024-11-22
Payer: COMMERCIAL

## 2024-11-22 DIAGNOSIS — F41.1 GAD (GENERALIZED ANXIETY DISORDER): ICD-10-CM

## 2024-11-22 DIAGNOSIS — F43.10 PTSD (POST-TRAUMATIC STRESS DISORDER): ICD-10-CM

## 2024-11-22 PROCEDURE — 90853 GROUP PSYCHOTHERAPY: CPT | Mod: AJ,95 | Performed by: SOCIAL WORKER

## 2024-11-22 NOTE — GROUP NOTE
Group Topic: Coping Skills   Group Date: 11/22/2024  Start Time: 12:00 PM  End Time:  1:00 PM  Facilitators: JACKY Shaw   Department: [facility]    Number of Participants: 3   Group Focus: coping skills  Treatment Modality: Cognitive Behavioral Therapy  Interventions utilized were patient education, story telling, and support  Purpose: coping skills and maladaptive thinking    This was a video psychotherapy group on a HIPAA compliant platform. All patients were provided with informed consent.     Date: 11/22/2024, Time: 12p-1p, Number of Patients: 3, User Name: [email or web address],  Number of Staff: 1  Group topic(s): Intrusive thoughts. Group began with a check in and patients sharing challenges they anticipate during the upcoming Thanksgiving holiday/how they plan to cope with the challenges. Patients received psychoeducation about intrusive thoughts and PMADs. Discussed common categories of intrusions and compulsions. Patients learned cognitive behavioral skills for challenging intrusive thoughts.    JACKY Bragg-S      Name: Cheyenne Wolf YOB: 1990   MR: [id number]      Cheyenne checked in sharing she has some anxiety about supporting her toddler with emotions and behaviors during the upcoming holiday. She shared a past intrusive thought related to driving with both of her children in the car and how she has used skills she learned in therapy to lessen the intensity and frequency of the thoughts.

## 2024-11-24 DIAGNOSIS — F41.1 GAD (GENERALIZED ANXIETY DISORDER): ICD-10-CM

## 2024-11-24 DIAGNOSIS — F43.10 PTSD (POST-TRAUMATIC STRESS DISORDER): ICD-10-CM

## 2024-11-27 ENCOUNTER — SOCIAL WORK (OUTPATIENT)
Dept: BEHAVIORAL HEALTH | Facility: CLINIC | Age: 34
End: 2024-11-27
Payer: COMMERCIAL

## 2024-11-27 DIAGNOSIS — F41.1 GAD (GENERALIZED ANXIETY DISORDER): ICD-10-CM

## 2024-11-27 DIAGNOSIS — F10.21 ALCOHOL USE DISORDER, MODERATE, IN EARLY REMISSION (MULTI): ICD-10-CM

## 2024-11-27 DIAGNOSIS — F43.10 PTSD (POST-TRAUMATIC STRESS DISORDER): ICD-10-CM

## 2024-11-27 PROCEDURE — 90837 PSYTX W PT 60 MINUTES: CPT | Mod: AJ,95 | Performed by: SOCIAL WORKER

## 2024-11-27 NOTE — PROGRESS NOTES
This session was conducted via HIPAA compliant video. Pt was provided with informed consent.    INDIVIDUAL PSYCHOTHERAPY    Time: 1:04-1:57pm    Mental Status: Alert & Oriented x3    Diagnosis/problems: PTSD, KENJI, alcohol use disorder    Method of therapy: EMDR, supportive, weekly    Patient Location: Pt joined the session virtually from her home (therapist joined from home)    Symptoms: Pt endorses persistent feelings of guilt and shame. Difficulty controlling worry and negative self-talk.     Functional status: Pt is mildly impaired in home life due to emotion dysregulation, anxiety.    Treatment plan: Pt will continue to abstain from alcohol and attend AA meetings multiple times a week. Pt to practice assertive communication, boundary setting- will support pt with boundary setting work in session and encouraging pt to ask for help. Explore possibility of EMDR for help resolving feelings of shame surrounding alcohol use.  Pt informed that provider is going on leave in month of December 2024. Pt will continue with  [employer] weekly support group during that time. Pt would like to continue with this provider in January 2025 for weekly therapy. Pt aware she can reach out to Milvia Jay, leader of [employer] group, should she need urgent support while this writer is away. Pt also connected with prescriber Christian Lopez.  Pt endorses overall stable mood, continuing to abstain from alcohol, increased boundary setting, increased asking for help.     Progress since last encounter: Significant - engaged in EMDR RDI protocol today.    MANAN Bragg

## 2024-12-04 RX ORDER — LORAZEPAM 1 MG/1
TABLET ORAL
Qty: 20 TABLET | Refills: 1 | Status: SHIPPED | OUTPATIENT
Start: 2024-12-04

## 2024-12-06 ENCOUNTER — APPOINTMENT (OUTPATIENT)
Dept: BEHAVIORAL HEALTH | Facility: CLINIC | Age: 34
End: 2024-12-06
Payer: COMMERCIAL

## 2024-12-13 ENCOUNTER — APPOINTMENT (OUTPATIENT)
Dept: BEHAVIORAL HEALTH | Facility: CLINIC | Age: 34
End: 2024-12-13
Payer: COMMERCIAL

## 2024-12-14 DIAGNOSIS — F43.10 PTSD (POST-TRAUMATIC STRESS DISORDER): ICD-10-CM

## 2024-12-14 DIAGNOSIS — F41.1 GAD (GENERALIZED ANXIETY DISORDER): ICD-10-CM

## 2024-12-16 RX ORDER — TRAZODONE HYDROCHLORIDE 50 MG/1
50-100 TABLET ORAL NIGHTLY
Qty: 60 TABLET | Refills: 0 | Status: SHIPPED | OUTPATIENT
Start: 2024-12-16

## 2024-12-20 ENCOUNTER — APPOINTMENT (OUTPATIENT)
Dept: BEHAVIORAL HEALTH | Facility: CLINIC | Age: 34
End: 2024-12-20
Payer: COMMERCIAL

## 2024-12-27 ENCOUNTER — APPOINTMENT (OUTPATIENT)
Dept: BEHAVIORAL HEALTH | Facility: CLINIC | Age: 34
End: 2024-12-27
Payer: COMMERCIAL

## 2025-01-03 ENCOUNTER — APPOINTMENT (OUTPATIENT)
Dept: BEHAVIORAL HEALTH | Facility: CLINIC | Age: 35
End: 2025-01-03
Payer: COMMERCIAL

## 2025-01-10 ENCOUNTER — DOCUMENTATION (OUTPATIENT)
Dept: BEHAVIORAL HEALTH | Facility: CLINIC | Age: 35
End: 2025-01-10
Payer: COMMERCIAL

## 2025-01-10 NOTE — PROGRESS NOTES
Provider contacted pt via phone to speak about her psychotherapy treatment plan. Informed pt provider will anticipate return to work on 1/31/2025 and inquired if pt would prefer to connect with a new provider due to the length of the leave. Pt stated she is doing well, she continues to be sober and connect with her AA groups and she is relocating to [place] next week. She would prefer to schedule again with this provider in February 2025 vs. Starting with a new therapist. Pt aware she can reach out to [facility] co-lead MANAN Babb or Dr. Christian Lopez for support in the interim.  JACKY Shaw

## 2025-01-12 DIAGNOSIS — F41.1 GAD (GENERALIZED ANXIETY DISORDER): ICD-10-CM

## 2025-01-12 RX ORDER — BUSPIRONE HYDROCHLORIDE 10 MG/1
10 TABLET ORAL 3 TIMES DAILY
Qty: 90 TABLET | Refills: 1 | OUTPATIENT
Start: 2025-01-12

## 2025-02-10 ENCOUNTER — TELEPHONE (OUTPATIENT)
Dept: BEHAVIORAL HEALTH | Facility: CLINIC | Age: 35
End: 2025-02-10
Payer: COMMERCIAL

## 2025-02-10 DIAGNOSIS — F41.1 GAD (GENERALIZED ANXIETY DISORDER): ICD-10-CM

## 2025-02-10 DIAGNOSIS — F43.10 PTSD (POST-TRAUMATIC STRESS DISORDER): ICD-10-CM

## 2025-02-10 RX ORDER — BUSPIRONE HYDROCHLORIDE 10 MG/1
10 TABLET ORAL 3 TIMES DAILY
Qty: 90 TABLET | Refills: 1 | Status: CANCELLED | OUTPATIENT
Start: 2025-02-10 | End: 2025-04-11

## 2025-02-10 RX ORDER — TRAZODONE HYDROCHLORIDE 50 MG/1
50-100 TABLET ORAL NIGHTLY
Qty: 60 TABLET | Refills: 0 | Status: CANCELLED | OUTPATIENT
Start: 2025-02-10

## 2025-02-14 DIAGNOSIS — F43.10 PTSD (POST-TRAUMATIC STRESS DISORDER): ICD-10-CM

## 2025-02-14 DIAGNOSIS — F41.1 GAD (GENERALIZED ANXIETY DISORDER): ICD-10-CM

## 2025-02-14 RX ORDER — BUSPIRONE HYDROCHLORIDE 10 MG/1
10 TABLET ORAL 3 TIMES DAILY
Qty: 270 TABLET | Refills: 3 | Status: SHIPPED | OUTPATIENT
Start: 2025-02-14 | End: 2026-02-14

## 2025-02-14 RX ORDER — FLUOXETINE HYDROCHLORIDE 40 MG/1
80 CAPSULE ORAL DAILY
Qty: 180 CAPSULE | Refills: 3 | Status: SHIPPED | OUTPATIENT
Start: 2025-02-14 | End: 2026-02-14

## 2025-02-20 ENCOUNTER — APPOINTMENT (OUTPATIENT)
Dept: BEHAVIORAL HEALTH | Facility: CLINIC | Age: 35
End: 2025-02-20
Payer: COMMERCIAL

## 2025-03-19 ENCOUNTER — APPOINTMENT (OUTPATIENT)
Dept: BEHAVIORAL HEALTH | Facility: CLINIC | Age: 35
End: 2025-03-19
Payer: COMMERCIAL